# Patient Record
Sex: MALE | Race: WHITE | NOT HISPANIC OR LATINO | Employment: FULL TIME | URBAN - NONMETROPOLITAN AREA
[De-identification: names, ages, dates, MRNs, and addresses within clinical notes are randomized per-mention and may not be internally consistent; named-entity substitution may affect disease eponyms.]

---

## 2017-05-11 DIAGNOSIS — I10 BENIGN ESSENTIAL HYPERTENSION: ICD-10-CM

## 2017-05-11 NOTE — TELEPHONE ENCOUNTER
Norvasc       Last Written Prescription Date: 2/13/2017  Last Fill Quantity: 30, # refills: 0  Last Office Visit with [facility], P or [facility] prescribing provider: 6/28/2016       Potassium   Date Value Ref Range Status   06/28/2016 4.3 3.4 - 5.3 mmol/L Final     Creatinine   Date Value Ref Range Status   06/28/2016 0.81 0.66 - 1.25 mg/dL Final     BP Readings from Last 3 Encounters:   06/28/16 128/74   06/14/16 158/88   08/25/15 (!) 154/96

## 2017-05-12 RX ORDER — AMLODIPINE BESYLATE 5 MG/1
TABLET ORAL
Qty: 30 TABLET | Refills: 0 | Status: SHIPPED | OUTPATIENT
Start: 2017-05-12 | End: 2017-06-20

## 2017-06-20 DIAGNOSIS — I10 BENIGN ESSENTIAL HYPERTENSION: ICD-10-CM

## 2017-06-22 RX ORDER — AMLODIPINE BESYLATE 5 MG/1
TABLET ORAL
Qty: 30 TABLET | Refills: 0 | Status: SHIPPED | OUTPATIENT
Start: 2017-06-22 | End: 2017-07-17

## 2017-07-17 DIAGNOSIS — I10 BENIGN ESSENTIAL HYPERTENSION: ICD-10-CM

## 2017-07-19 RX ORDER — AMLODIPINE BESYLATE 5 MG/1
TABLET ORAL
Qty: 30 TABLET | Refills: 0 | Status: SHIPPED | OUTPATIENT
Start: 2017-07-19 | End: 2017-08-17

## 2017-08-17 DIAGNOSIS — I10 BENIGN ESSENTIAL HYPERTENSION: ICD-10-CM

## 2017-08-17 NOTE — TELEPHONE ENCOUNTER
Norvasc      Last Written Prescription Date: 7/19/17  Last Fill Quantity: 30, # refills: 0    Last Office Visit with G, P or [facility] prescribing provider:  6/26/16   Future Office Visit:        BP Readings from Last 3 Encounters:   06/28/16 128/74   06/14/16 158/88   08/25/15 (!) 154/96

## 2017-08-17 NOTE — LETTER
Josef Rod  [address] [facility] [address]      August 21, 2017       Dear Josef Rod,    APPOINTMENT REMINDER:       Our record indicates that it is time for you to be seen for an office visit with Dr. William.    You may call our office at [phone] to schedule an appointment for your annual exam.     Please disregard this notice if you have already made an appointment.      Sincerely,    Alban William MD  [facility]

## 2017-08-21 RX ORDER — AMLODIPINE BESYLATE 5 MG/1
TABLET ORAL
Qty: 30 TABLET | Refills: 0 | Status: SHIPPED | OUTPATIENT
Start: 2017-08-21 | End: 2017-09-19

## 2017-09-19 DIAGNOSIS — I10 BENIGN ESSENTIAL HYPERTENSION: ICD-10-CM

## 2017-09-20 RX ORDER — AMLODIPINE BESYLATE 5 MG/1
TABLET ORAL
Qty: 30 TABLET | Refills: 0 | Status: SHIPPED | OUTPATIENT
Start: 2017-09-20 | End: 2017-11-01

## 2017-09-26 ENCOUNTER — OFFICE VISIT (OUTPATIENT)
Dept: FAMILY MEDICINE | Facility: OTHER | Age: 57
End: 2017-09-26
Attending: FAMILY MEDICINE
Payer: COMMERCIAL

## 2017-09-26 VITALS
TEMPERATURE: 97.7 F | DIASTOLIC BLOOD PRESSURE: 80 MMHG | OXYGEN SATURATION: 98 % | BODY MASS INDEX: 35.5 KG/M2 | HEART RATE: 63 BPM | RESPIRATION RATE: 17 BRPM | HEIGHT: 70 IN | WEIGHT: 248 LBS | SYSTOLIC BLOOD PRESSURE: 140 MMHG

## 2017-09-26 DIAGNOSIS — E78.5 DYSLIPIDEMIA: ICD-10-CM

## 2017-09-26 DIAGNOSIS — E66.01 MORBID OBESITY (H): ICD-10-CM

## 2017-09-26 DIAGNOSIS — I10 BENIGN ESSENTIAL HYPERTENSION: Primary | ICD-10-CM

## 2017-09-26 LAB
ALBUMIN SERPL-MCNC: 4.3 G/DL (ref 3.4–5)
ALP SERPL-CCNC: 63 U/L (ref 40–150)
ALT SERPL W P-5'-P-CCNC: 38 U/L (ref 0–70)
ANION GAP SERPL CALCULATED.3IONS-SCNC: 6 MMOL/L (ref 3–14)
AST SERPL W P-5'-P-CCNC: 23 U/L (ref 0–45)
BASOPHILS # BLD AUTO: 0 10E9/L (ref 0–0.2)
BASOPHILS NFR BLD AUTO: 0.2 %
BILIRUB SERPL-MCNC: 0.9 MG/DL (ref 0.2–1.3)
BUN SERPL-MCNC: 13 MG/DL (ref 7–30)
CALCIUM SERPL-MCNC: 9.4 MG/DL (ref 8.5–10.1)
CHLORIDE SERPL-SCNC: 101 MMOL/L (ref 94–109)
CHOLEST SERPL-MCNC: 234 MG/DL
CO2 SERPL-SCNC: 30 MMOL/L (ref 20–32)
CREAT SERPL-MCNC: 1.04 MG/DL (ref 0.66–1.25)
DIFFERENTIAL METHOD BLD: NORMAL
EOSINOPHIL # BLD AUTO: 0.1 10E9/L (ref 0–0.7)
EOSINOPHIL NFR BLD AUTO: 2.5 %
ERYTHROCYTE [DISTWIDTH] IN BLOOD BY AUTOMATED COUNT: 12.9 % (ref 10–15)
GFR SERPL CREATININE-BSD FRML MDRD: 74 ML/MIN/1.7M2
GLUCOSE SERPL-MCNC: 81 MG/DL (ref 70–99)
HCT VFR BLD AUTO: 47.6 % (ref 40–53)
HDLC SERPL-MCNC: 44 MG/DL
HGB BLD-MCNC: 16.2 G/DL (ref 13.3–17.7)
IMM GRANULOCYTES # BLD: 0 10E9/L (ref 0–0.4)
IMM GRANULOCYTES NFR BLD: 0.2 %
LDLC SERPL CALC-MCNC: 146 MG/DL
LYMPHOCYTES # BLD AUTO: 1.8 10E9/L (ref 0.8–5.3)
LYMPHOCYTES NFR BLD AUTO: 35.3 %
MCH RBC QN AUTO: 29.1 PG (ref 26.5–33)
MCHC RBC AUTO-ENTMCNC: 34 G/DL (ref 31.5–36.5)
MCV RBC AUTO: 86 FL (ref 78–100)
MONOCYTES # BLD AUTO: 0.5 10E9/L (ref 0–1.3)
MONOCYTES NFR BLD AUTO: 9.6 %
NEUTROPHILS # BLD AUTO: 2.7 10E9/L (ref 1.6–8.3)
NEUTROPHILS NFR BLD AUTO: 52.2 %
NONHDLC SERPL-MCNC: 190 MG/DL
NRBC # BLD AUTO: 0 10*3/UL
NRBC BLD AUTO-RTO: 0 /100
PLATELET # BLD AUTO: 268 10E9/L (ref 150–450)
POTASSIUM SERPL-SCNC: 4.9 MMOL/L (ref 3.4–5.3)
PROT SERPL-MCNC: 8 G/DL (ref 6.8–8.8)
RBC # BLD AUTO: 5.57 10E12/L (ref 4.4–5.9)
SODIUM SERPL-SCNC: 137 MMOL/L (ref 133–144)
TRIGL SERPL-MCNC: 218 MG/DL
TSH SERPL DL<=0.005 MIU/L-ACNC: 2.85 MU/L (ref 0.4–4)
WBC # BLD AUTO: 5.1 10E9/L (ref 4–11)

## 2017-09-26 PROCEDURE — 80061 LIPID PANEL: CPT | Performed by: FAMILY MEDICINE

## 2017-09-26 PROCEDURE — 80050 GENERAL HEALTH PANEL: CPT | Performed by: FAMILY MEDICINE

## 2017-09-26 PROCEDURE — 99214 OFFICE O/P EST MOD 30 MIN: CPT | Performed by: FAMILY MEDICINE

## 2017-09-26 PROCEDURE — 36415 COLL VENOUS BLD VENIPUNCTURE: CPT | Performed by: FAMILY MEDICINE

## 2017-09-26 RX ORDER — ATORVASTATIN CALCIUM 40 MG/1
20 TABLET, FILM COATED ORAL DAILY
Qty: 45 TABLET | Refills: 0 | Status: SHIPPED | OUTPATIENT
Start: 2017-09-26 | End: 2017-11-03

## 2017-09-26 ASSESSMENT — ANXIETY QUESTIONNAIRES
2. NOT BEING ABLE TO STOP OR CONTROL WORRYING: NOT AT ALL
1. FEELING NERVOUS, ANXIOUS, OR ON EDGE: NOT AT ALL
7. FEELING AFRAID AS IF SOMETHING AWFUL MIGHT HAPPEN: NOT AT ALL
GAD7 TOTAL SCORE: 0
IF YOU CHECKED OFF ANY PROBLEMS ON THIS QUESTIONNAIRE, HOW DIFFICULT HAVE THESE PROBLEMS MADE IT FOR YOU TO DO YOUR WORK, TAKE CARE OF THINGS AT HOME, OR GET ALONG WITH OTHER PEOPLE: NOT DIFFICULT AT ALL
5. BEING SO RESTLESS THAT IT IS HARD TO SIT STILL: NOT AT ALL
6. BECOMING EASILY ANNOYED OR IRRITABLE: NOT AT ALL
3. WORRYING TOO MUCH ABOUT DIFFERENT THINGS: NOT AT ALL

## 2017-09-26 ASSESSMENT — PAIN SCALES - GENERAL: PAINLEVEL: NO PAIN (0)

## 2017-09-26 ASSESSMENT — PATIENT HEALTH QUESTIONNAIRE - PHQ9
SUM OF ALL RESPONSES TO PHQ QUESTIONS 1-9: 2
5. POOR APPETITE OR OVEREATING: NOT AT ALL

## 2017-09-26 NOTE — PROGRESS NOTES
SUBJECTIVE:  Josef Rod, 56 year old, male is seen in follow up of dyslipidemia. His labs are reviewed.  Risk is reviewed.  The 10-year ASCVD risk score (Alex LAMONT Jr, et al., 2013) is: 10.2%    Values used to calculate the score:      Age: 56 years      Sex: Male      Is Non- : No      Diabetic: No      Tobacco smoker: No      Systolic Blood Pressure: 140 mmHg      Is BP treated: Yes      HDL Cholesterol: 44 mg/dL      Total Cholesterol: 234 mg/dL     He has previously been on statin therapy    Follow up hypertension.  His blood pressure remains controlled on current regimen.    Obesity care package to be completed.    Denies chest pain, dyspnea, decreased exercise tolerance, dizzyness, nausea, vomiting, diaphoresis, palpitations, numbness, tingling, or paresthesias.      Current Outpatient Prescriptions   Medication Sig Dispense Refill     amLODIPine (NORVASC) 5 MG tablet Take 1 tablet (5 mg) by mouth daily 30 tablet 0     Cholecalciferol (VITAMIN D) 2000 UNITS tablet Take 2,000 Units by mouth daily 100 tablet 3     aspirin 325 MG tablet Take 1 tablet by mouth daily          Allergies   Allergen Reactions     Penicillins        Past Medical History:   Diagnosis Date     Dyslipidemia 2/6/2001     Obesity 8/21/2012     Past Surgical History:   Procedure Laterality Date     arthroscopy  2005    RT     ARTHROSCOPY KNEE  2005    LT     TONSILLECTOMY  19??     Family History   Problem Relation Age of Onset     Arthritis Mother      HEART DISEASE Father      Social History     Social History     Marital status:      Spouse name: N/A     Number of children: N/A     Years of education: N/A     Occupational History     Not on file.     Social History Main Topics     Smoking status: Never Smoker     Smokeless tobacco: Never Used      Comment: no passive exposure     Alcohol use Yes      Comment: beer and liquor monthly     Drug use: No     Sexual activity: Yes     Partners: Female  "    Other Topics Concern      Service No     Blood Transfusions Yes     Permits if needed     Caffeine Concern No     Occupational Exposure No     Hobby Hazards No     Sleep Concern No     Stress Concern No     Weight Concern No     Special Diet No     Back Care No     Exercise No     Seat Belt Yes     Self-Exams Yes     Parent/Sibling W/ Cabg, Mi Or Angioplasty Before 65f 55m? No     Social History Narrative         Review Of Systems  Constitutional, HEENT, cardiovascular, pulmonary, gi and gu systems are negative, except as otherwise noted.      OBJECTIVE:/80 (BP Location: Left arm, Patient Position: Chair, Cuff Size: Adult Large)  Pulse 63  Temp 97.7  F (36.5  C) (Tympanic)  Resp 17  Ht 5' 10\" (1.778 m)  Wt 248 lb (112.5 kg)  SpO2 98%  BMI 35.58 kg/m2      Exam:  Physical Exam   Constitutional: He is oriented to person, place, and time. He appears well-developed and well-nourished. No distress.   Neurological: He is alert and oriented to person, place, and time.   Psychiatric: He has a normal mood and affect.     Other exam not repeated    Labs:  Office Visit on 06/28/2016   Component Date Value Ref Range Status     WBC 06/28/2016 5.6  4.0 - 11.0 10e9/L Final     RBC Count 06/28/2016 5.74  4.4 - 5.9 10e12/L Final     Hemoglobin 06/28/2016 16.5  13.3 - 17.7 g/dL Final     Hematocrit 06/28/2016 48.4  40.0 - 53.0 % Final     MCV 06/28/2016 84  78 - 100 fl Final     MCH 06/28/2016 28.7  26.5 - 33.0 pg Final     MCHC 06/28/2016 34.1  31.5 - 36.5 g/dL Final     RDW 06/28/2016 12.6  10.0 - 15.0 % Final     Platelet Count 06/28/2016 273  150 - 450 10e9/L Final     Diff Method 06/28/2016 Automated Method   Final     % Neutrophils 06/28/2016 54.4  % Final     % Lymphocytes 06/28/2016 33.2  % Final     % Monocytes 06/28/2016 7.8  % Final     % Eosinophils 06/28/2016 3.7  % Final     % Basophils 06/28/2016 0.2  % Final     % Immature Granulocytes 06/28/2016 0.7  % Final     Nucleated RBCs 06/28/2016 0  0 " /100 Final     Absolute Neutrophil 06/28/2016 3.1  1.6 - 8.3 10e9/L Final     Absolute Lymphocytes 06/28/2016 1.9  0.8 - 5.3 10e9/L Final     Absolute Monocytes 06/28/2016 0.4  0.0 - 1.3 10e9/L Final     Absolute Eosinophils 06/28/2016 0.2  0.0 - 0.7 10e9/L Final     Absolute Basophils 06/28/2016 0.0  0.0 - 0.2 10e9/L Final     Abs Immature Granulocytes 06/28/2016 0.0  0 - 0.4 10e9/L Final     Absolute Nucleated RBC 06/28/2016 0.0   Final     Cholesterol 06/28/2016 194  <200 mg/dL Final     Triglycerides 06/28/2016 534* <150 mg/dL Final    Comment: Borderline high:  150-199 mg/dl   High:             200-499 mg/dl   Very high:       >499 mg/dl       HDL Cholesterol 06/28/2016 40  >39 mg/dL Final     LDL Cholesterol Calculated 06/28/2016 Cannot estimate LDL when triglyceride exceeds 400 mg/dL  <100 mg/dL Final     Non HDL Cholesterol 06/28/2016 154* <130 mg/dL Final    Comment: Above Desirable:  130-159 mg/dl   Borderline high:  160-189 mg/dl   High:             190-219 mg/dl   Very high:       >219 mg/dl       Sodium 06/28/2016 137  133 - 144 mmol/L Final     Potassium 06/28/2016 4.3  3.4 - 5.3 mmol/L Final     Chloride 06/28/2016 103  94 - 109 mmol/L Final     Carbon Dioxide 06/28/2016 28  20 - 32 mmol/L Final     Anion Gap 06/28/2016 6  3 - 14 mmol/L Final     Glucose 06/28/2016 85  70 - 99 mg/dL Final     Urea Nitrogen 06/28/2016 15  7 - 30 mg/dL Final     Creatinine 06/28/2016 0.81  0.66 - 1.25 mg/dL Final     GFR Estimate 06/28/2016   >60 mL/min/1.7m2 Final                    Value:>90  Non  GFR Calc       GFR Estimate If Black 06/28/2016   >60 mL/min/1.7m2 Final                    Value:>90   GFR Calc       Calcium 06/28/2016 9.1  8.5 - 10.1 mg/dL Final     Bilirubin Total 06/28/2016 0.6  0.2 - 1.3 mg/dL Final     Albumin 06/28/2016 4.3  3.4 - 5.0 g/dL Final     Protein Total 06/28/2016 8.1  6.8 - 8.8 g/dL Final     Alkaline Phosphatase 06/28/2016 78  40 - 150 U/L Final     ALT  06/28/2016 40  0 - 70 U/L Final     AST 06/28/2016 37  0 - 45 U/L Final       ASSESSMENT/PLAN:  HTN (hypertension)  Labs are drawn.  Continue same medications as outlined  - CBC with platelets differential  - Comprehensive metabolic panel  - TSH with free T4 reflex    Dyslipidemia  Restart atorvastatin as written.  - atorvastatin (LIPITOR) 40 MG tablet; Take 0.5 tablets (20 mg) by mouth daily    Morbid obesity (H)  Body mass index is 35.58 kg/(m^2).  Weight management plan: Discussed healthy diet and exercise guidelines and patient will follow up in 12 months in clinic to re-evaluate.          Alban William MD

## 2017-09-26 NOTE — MR AVS SNAPSHOT
After Visit Summary   9/26/2017    Josef Rod    MRN: [id number]           Patient Information     Date Of Birth          1960        Visit Information        Provider Department      9/26/2017 2:40 PM Alban William MD [facility]        Today's Diagnoses     HTN (hypertension)    -  1    Dyslipidemia        Morbid obesity (H)          Care Instructions    Take atorvastatin once daily          Follow-ups after your visit        Follow-up notes from your care team     Return in about 3 months (around 12/26/2017) for Lab testing, Follow Up Visit.      Who to contact     If you have questions or need follow up information about today's clinic visit or your schedule please contact [facility] directly at [phone].  Normal or non-critical lab and imaging results will be communicated to you by Janalakshmihart, letter or phone within 4 business days after the clinic has received the results. If you do not hear from us within 7 days, please contact the clinic through Janalakshmihart or phone. If you have a critical or abnormal lab result, we will notify you by phone as soon as possible.  Submit refill requests through Youku or call your pharmacy and they will forward the refill request to us. Please allow 3 business days for your refill to be completed.          Additional Information About Your Visit        MyChart Information     Youku gives you secure access to your electronic health record. If you see a primary care provider, you can also send messages to your care team and make appointments. If you have questions, please call your primary care clinic.  If you do not have a primary care provider, please call [phone] and they will assist you.        Care EveryWhere ID     This is your Care EveryWhere ID. This could be used by other organizations to access your [place] medical records  [id number]        Your Vitals Were     Pulse Temperature Respirations Height  "Pulse Oximetry BMI (Body Mass Index)    63 97.7  F (36.5  C) (Tympanic) 17 5' 10\" (1.778 m) 98% 35.58 kg/m2       Blood Pressure from Last 3 Encounters:   09/26/17 140/80   06/28/16 128/74   06/14/16 158/88    Weight from Last 3 Encounters:   09/26/17 248 lb (112.5 kg)   06/28/16 245 lb (111.1 kg)   06/14/16 240 lb (108.9 kg)              We Performed the Following     CBC with platelets differential     Comprehensive metabolic panel     Lipid Profile     TSH with free T4 reflex          Where to get your medicines      These medications were sent to [facility]  [place] - [address] [place] [address]     Phone:  [phone]     atorvastatin 40 MG tablet          Primary Care Provider Office Phone # Fax #    Alban William [phone]       [facility] [address] [facility]  [place] [address]        Equal Access to Services     [facility] Hospital[facility]NA[facility] : Hadii gibran ku hadasho Soomaali, waaxda luqadaha, qaybta kaalmada adeegyada, dani maharajin hayalician manolo shoemaker . So [facility] [phone].    ATENCIÓN: Si habla español, tiene a genao disposición servicios gratuitos de asistencia lingüística. [facility] [phone].    We comply with applicable federal civil rights laws and [place] laws. We do not discriminate on the basis of race, color, national origin, age, disability, sex, sexual orientation, or gender identity.            Thank you!     Thank you for choosing [facility]  for your care. Our goal is always to provide you with excellent care. Hearing back from our patients is one way we can continue to improve our services. Please take a few minutes to complete the written survey that you may receive in the mail after your visit with us. Thank you!             Your Updated Medication List - Protect others around you: Learn how to safely use, store and throw away your medicines at www.disposemymeds.org.          This list is accurate as of: 9/26/17 11:59 PM.  " Always use your most recent med list.                   Brand Name Dispense Instructions for use Diagnosis    amLODIPine 5 MG tablet    NORVASC    30 tablet    Take 1 tablet (5 mg) by mouth daily    Benign essential hypertension       aspirin 325 MG tablet      Take 1 tablet by mouth daily        atorvastatin 40 MG tablet    LIPITOR    45 tablet    Take 0.5 tablets (20 mg) by mouth daily    Dyslipidemia       vitamin D 2000 UNITS tablet     100 tablet    Take 2,000 Units by mouth daily    Vitamin D deficiency

## 2017-09-26 NOTE — NURSING NOTE
"Chief Complaint   Patient presents with     Recheck Medication       Initial /80 (BP Location: Left arm, Patient Position: Chair, Cuff Size: Adult Large)  Pulse 63  Temp 97.7  F (36.5  C) (Tympanic)  Resp 17  Ht 5' 10\" (1.778 m)  Wt 248 lb (112.5 kg)  SpO2 98%  BMI 35.58 kg/m2 Estimated body mass index is 35.58 kg/(m^2) as calculated from the following:    Height as of this encounter: 5' 10\" (1.778 m).    Weight as of this encounter: 248 lb (112.5 kg).  Medication Reconciliation: complete   Emily Pinedo    "

## 2017-09-27 ASSESSMENT — ANXIETY QUESTIONNAIRES: GAD7 TOTAL SCORE: 0

## 2017-10-01 PROBLEM — E66.01 MORBID OBESITY (H): Status: ACTIVE | Noted: 2017-10-01

## 2017-11-01 DIAGNOSIS — I10 BENIGN ESSENTIAL HYPERTENSION: ICD-10-CM

## 2017-11-01 NOTE — TELEPHONE ENCOUNTER
Norvasc       Last Written Prescription Date: 9/20/2017  Last Fill Quantity: 30,  # refills: 0   Last Office Visit with G, [facility] or [facility] prescribing provider: 9/26/2017

## 2017-11-03 ENCOUNTER — TRANSFERRED RECORDS (OUTPATIENT)
Dept: HEALTH INFORMATION MANAGEMENT | Facility: HOSPITAL | Age: 57
End: 2017-11-03

## 2017-11-03 DIAGNOSIS — E78.5 DYSLIPIDEMIA: ICD-10-CM

## 2017-11-03 RX ORDER — AMLODIPINE BESYLATE 5 MG/1
TABLET ORAL
Qty: 30 TABLET | Refills: 8 | Status: SHIPPED | OUTPATIENT
Start: 2017-11-03 | End: 2018-07-05

## 2017-11-06 NOTE — TELEPHONE ENCOUNTER
Lipitor       Last Written Prescription Date: 9/2/2017  Last Fill Quantity: 45,  # refills: 0   Last Office Visit with G, [facility] or [facility] prescribing provider: 9/26/2017

## 2017-11-08 RX ORDER — ATORVASTATIN CALCIUM 40 MG/1
TABLET, FILM COATED ORAL
Qty: 45 TABLET | Refills: 0 | Status: SHIPPED | OUTPATIENT
Start: 2017-11-08 | End: 2018-10-09

## 2017-11-08 NOTE — TELEPHONE ENCOUNTER
Lipitor  Last office visit: 9/26/17  Last fill: 9/26/17 #45, 0 R.  Just restarted Lipitor.  Please advise.  Thank you.

## 2017-11-17 DIAGNOSIS — M17.0 PRIMARY OSTEOARTHRITIS OF BOTH KNEES: Primary | ICD-10-CM

## 2017-11-20 ENCOUNTER — OFFICE VISIT (OUTPATIENT)
Dept: FAMILY MEDICINE | Facility: OTHER | Age: 57
End: 2017-11-20
Attending: FAMILY MEDICINE
Payer: COMMERCIAL

## 2017-11-20 VITALS
TEMPERATURE: 98 F | BODY MASS INDEX: 37.22 KG/M2 | SYSTOLIC BLOOD PRESSURE: 148 MMHG | HEIGHT: 70 IN | OXYGEN SATURATION: 97 % | WEIGHT: 260 LBS | DIASTOLIC BLOOD PRESSURE: 82 MMHG | HEART RATE: 71 BPM

## 2017-11-20 DIAGNOSIS — M17.0 PRIMARY OSTEOARTHRITIS OF BOTH KNEES: Primary | ICD-10-CM

## 2017-11-20 DIAGNOSIS — Z01.818 PREOP GENERAL PHYSICAL EXAM: Primary | ICD-10-CM

## 2017-11-20 LAB
ANION GAP SERPL CALCULATED.3IONS-SCNC: 8 MMOL/L (ref 3–14)
APTT PPP: 25 SEC (ref 24–37)
BASOPHILS # BLD AUTO: 0 10E9/L (ref 0–0.2)
BASOPHILS NFR BLD AUTO: 0.3 %
BUN SERPL-MCNC: 17 MG/DL (ref 7–30)
CALCIUM SERPL-MCNC: 9.6 MG/DL (ref 8.5–10.1)
CHLORIDE SERPL-SCNC: 101 MMOL/L (ref 94–109)
CO2 SERPL-SCNC: 29 MMOL/L (ref 20–32)
CREAT SERPL-MCNC: 0.98 MG/DL (ref 0.66–1.25)
DIFFERENTIAL METHOD BLD: NORMAL
EOSINOPHIL # BLD AUTO: 0.7 10E9/L (ref 0–0.7)
EOSINOPHIL NFR BLD AUTO: 9.7 %
ERYTHROCYTE [DISTWIDTH] IN BLOOD BY AUTOMATED COUNT: 12.7 % (ref 10–15)
ERYTHROCYTE [SEDIMENTATION RATE] IN BLOOD BY WESTERGREN METHOD: 5 MM/H (ref 0–20)
GFR SERPL CREATININE-BSD FRML MDRD: 79 ML/MIN/1.7M2
GLUCOSE SERPL-MCNC: 79 MG/DL (ref 70–99)
HCT VFR BLD AUTO: 48.9 % (ref 40–53)
HGB BLD-MCNC: 16.6 G/DL (ref 13.3–17.7)
IMM GRANULOCYTES # BLD: 0 10E9/L (ref 0–0.4)
IMM GRANULOCYTES NFR BLD: 0.4 %
INR PPP: 1.02 (ref 0.8–1.2)
LYMPHOCYTES # BLD AUTO: 2.2 10E9/L (ref 0.8–5.3)
LYMPHOCYTES NFR BLD AUTO: 30 %
MCH RBC QN AUTO: 28.9 PG (ref 26.5–33)
MCHC RBC AUTO-ENTMCNC: 33.9 G/DL (ref 31.5–36.5)
MCV RBC AUTO: 85 FL (ref 78–100)
MONOCYTES # BLD AUTO: 0.6 10E9/L (ref 0–1.3)
MONOCYTES NFR BLD AUTO: 8.6 %
NEUTROPHILS # BLD AUTO: 3.7 10E9/L (ref 1.6–8.3)
NEUTROPHILS NFR BLD AUTO: 51 %
NRBC # BLD AUTO: 0 10*3/UL
NRBC BLD AUTO-RTO: 0 /100
PLATELET # BLD AUTO: 242 10E9/L (ref 150–450)
POTASSIUM SERPL-SCNC: 4.3 MMOL/L (ref 3.4–5.3)
RBC # BLD AUTO: 5.75 10E12/L (ref 4.4–5.9)
SODIUM SERPL-SCNC: 138 MMOL/L (ref 133–144)
WBC # BLD AUTO: 7.2 10E9/L (ref 4–11)

## 2017-11-20 PROCEDURE — 85730 THROMBOPLASTIN TIME PARTIAL: CPT | Performed by: FAMILY MEDICINE

## 2017-11-20 PROCEDURE — 99214 OFFICE O/P EST MOD 30 MIN: CPT | Mod: 25 | Performed by: FAMILY MEDICINE

## 2017-11-20 PROCEDURE — 93000 ELECTROCARDIOGRAM COMPLETE: CPT | Performed by: INTERNAL MEDICINE

## 2017-11-20 PROCEDURE — 85610 PROTHROMBIN TIME: CPT | Performed by: FAMILY MEDICINE

## 2017-11-20 PROCEDURE — 80048 BASIC METABOLIC PNL TOTAL CA: CPT | Performed by: FAMILY MEDICINE

## 2017-11-20 PROCEDURE — 85652 RBC SED RATE AUTOMATED: CPT | Performed by: FAMILY MEDICINE

## 2017-11-20 PROCEDURE — 36415 COLL VENOUS BLD VENIPUNCTURE: CPT | Performed by: FAMILY MEDICINE

## 2017-11-20 PROCEDURE — 85025 COMPLETE CBC W/AUTO DIFF WBC: CPT | Performed by: FAMILY MEDICINE

## 2017-11-20 ASSESSMENT — PAIN SCALES - GENERAL: PAINLEVEL: SEVERE PAIN (7)

## 2017-11-20 ASSESSMENT — ANXIETY QUESTIONNAIRES
5. BEING SO RESTLESS THAT IT IS HARD TO SIT STILL: NOT AT ALL
1. FEELING NERVOUS, ANXIOUS, OR ON EDGE: NOT AT ALL
6. BECOMING EASILY ANNOYED OR IRRITABLE: NOT AT ALL
2. NOT BEING ABLE TO STOP OR CONTROL WORRYING: NOT AT ALL
IF YOU CHECKED OFF ANY PROBLEMS ON THIS QUESTIONNAIRE, HOW DIFFICULT HAVE THESE PROBLEMS MADE IT FOR YOU TO DO YOUR WORK, TAKE CARE OF THINGS AT HOME, OR GET ALONG WITH OTHER PEOPLE: NOT DIFFICULT AT ALL
3. WORRYING TOO MUCH ABOUT DIFFERENT THINGS: NOT AT ALL
GAD7 TOTAL SCORE: 0
7. FEELING AFRAID AS IF SOMETHING AWFUL MIGHT HAPPEN: NOT AT ALL

## 2017-11-20 ASSESSMENT — PATIENT HEALTH QUESTIONNAIRE - PHQ9
SUM OF ALL RESPONSES TO PHQ QUESTIONS 1-9: 0
5. POOR APPETITE OR OVEREATING: NOT AT ALL

## 2017-11-20 NOTE — MR AVS SNAPSHOT
After Visit Summary   11/20/2017    Josef Rod    MRN: [id number]           Patient Information     Date Of Birth          1960        Visit Information        Provider Department      11/20/2017 11:20 AM [facility]        Today's Diagnoses     Preop general physical exam    -  1      Care Instructions      Before Your Surgery      Call your surgeon if there is any change in your health. This includes signs of a cold or flu (such as a sore throat, runny nose, cough, rash or fever).    Do not smoke, drink alcohol or take over the counter medicine (unless your surgeon or primary care doctor tells you to) for the 24 hours before and after surgery.    If you take prescribed drugs: Follow your doctor s orders about which medicines to take and which to stop until after surgery.    Eating and drinking prior to surgery: follow the instructions from your surgeon    Take a shower or bath the night before surgery. Use the soap your surgeon gave you to gently clean your skin. If you do not have soap from your surgeon, use your regular soap. Do not shave or scrub the surgery site.  Wear clean pajamas and have clean sheets on your bed.           Follow-ups after your visit        Who to contact     If you have questions or need follow up information about today's clinic visit or your schedule please contact [facility] directly at [phone].  Normal or non-critical lab and imaging results will be communicated to you by MyChart, letter or phone within 4 business days after the clinic has received the results. If you do not hear from us within 7 days, please contact the clinic through MyChart or phone. If you have a critical or abnormal lab result, we will notify you by phone as soon as possible.  Submit refill requests through FIZZA or call your pharmacy and they will forward the refill request to us. Please allow 3 business days for your refill to be  "completed.          Additional Information About Your Visit        MyChart Information     ShareSquare gives you secure access to your electronic health record. If you see a primary care provider, you can also send messages to your care team and make appointments. If you have questions, please call your primary care clinic.  If you do not have a primary care provider, please call [phone] and they will assist you.        Care EveryWhere ID     This is your Care EveryWhere ID. This could be used by other organizations to access your [place] medical records  [id number]        Your Vitals Were     Pulse Temperature Height Pulse Oximetry BMI (Body Mass Index)       71 98  F (36.7  C) (Tympanic) 5' 10\" (1.778 m) 97% 37.31 kg/m2        Blood Pressure from Last 3 Encounters:   11/20/17 148/82   09/26/17 140/80   06/28/16 128/74    Weight from Last 3 Encounters:   11/20/17 260 lb (117.9 kg)   09/26/17 248 lb (112.5 kg)   06/28/16 245 lb (111.1 kg)              We Performed the Following     Basic metabolic panel     CBC with platelets differential     EKG 12-lead complete w/read - (Clinic Performed)     ESR: Erythrocyte sedimentation rate     INR     Partial thromboplastin time        Primary Care Provider Office Phone # Fax #    Alban William [phone]       [facility] [address]  [place] [address]        Equal Access to Services     TRISH FERNANDEZ AH: Hadii aad ku hadasho Soomaali, waaxda luqadaha, qaybta kaalmada adeegyada, dani shoemaker . So [facility] [phone].    ATENCIÓN: Si habla español, tiene a genao disposición servicios gratuitos de asistencia lingüística. Llame al [phone].    We comply with applicable federal civil rights laws and [place] laws. We do not discriminate on the basis of race, color, national origin, age, disability, sex, sexual orientation, or gender identity.            Thank you!     Thank you for choosing [facility]  for " your care. Our goal is always to provide you with excellent care. Hearing back from our patients is one way we can continue to improve our services. Please take a few minutes to complete the written survey that you may receive in the mail after your visit with us. Thank you!             Your Updated Medication List - Protect others around you: Learn how to safely use, store and throw away your medicines at www.disposemymeds.org.          This list is accurate as of: 11/20/17 11:59 PM.  Always use your most recent med list.                   Brand Name Dispense Instructions for use Diagnosis    amLODIPine 5 MG tablet    NORVASC    30 tablet    Take 1 tablet (5 mg) by mouth daily    Benign essential hypertension       aspirin 325 MG tablet      Take 1 tablet by mouth daily        atorvastatin 40 MG tablet    LIPITOR    45 tablet    Take 0.5 tablets (20 mg) by mouth daily    Dyslipidemia       vitamin D 2000 UNITS tablet     100 tablet    Take 2,000 Units by mouth daily    Vitamin D deficiency

## 2017-11-20 NOTE — NURSING NOTE
"Chief Complaint   Patient presents with     Pre-Op Exam       Initial /82  Pulse 71  Temp 98  F (36.7  C) (Tympanic)  Ht 5' 10\" (1.778 m)  Wt 260 lb (117.9 kg)  SpO2 97%  BMI 37.31 kg/m2 Estimated body mass index is 37.31 kg/(m^2) as calculated from the following:    Height as of this encounter: 5' 10\" (1.778 m).    Weight as of this encounter: 260 lb (117.9 kg).  Medication Reconciliation: complete   HARISH MORTON LPN  "

## 2017-11-20 NOTE — PROGRESS NOTES
[facility] [place]  [address]  [place] [address]  [phone]  Dept: [phone]    PRE-OP EVALUATION:  Today's date: 2017    Josef Rod (: 1960) presents for pre-operative evaluation assessment as requested by Dr. Bean.  He requires evaluation and anesthesia risk assessment prior to undergoing surgery/procedure for treatment of knee pain .  Proposed procedure: Right total knee replacement    Date of Surgery/ Procedure: 10/05/17  Time of Surgery/ Procedure: [facility]/Surgical Facility: [facility]  Primary Physician: Alban William  Type of Anesthesia Anticipated: to be determined    Patient has a Health Care Directive or Living Will:  NO    1. NO - Do you have a history of heart attack, stroke, stent, bypass or surgery on an artery in the head, neck, heart or legs?  2. NO - Do you ever have any pain or discomfort in your chest?  3. NO - Do you have a history of  Heart Failure?  4. NO - Are you troubled by shortness of breath when: walking on the level, up a slight hill or at night?  5. NO - Do you currently have a cold, bronchitis or other respiratory infection?  6. NO - Do you have a cough, shortness of breath or wheezing?  7. NO - Do you sometimes get pains in the calves of your legs when you walk?  8. NO - Do you or anyone in your family have previous history of blood clots?  9. NO - Do you or does anyone in your family have a serious bleeding problem such as prolonged bleeding following surgeries or cuts?  10. NO - Have you ever had problems with anemia or been told to take iron pills?  11. NO - Have you had any abnormal blood loss such as black, tarry or bloody stools, or abnormal vaginal bleeding?  12. NO - Have you ever had a blood transfusion?  13. NO - Have you or any of your relatives ever had problems with anesthesia?  14. NO - Do you have sleep apnea, excessive snoring or daytime drowsiness?  15. NO - Do you have any prosthetic heart valves?  16. NO - Do you  have prosthetic joints?  17. NO - Is there any chance that you may be pregnant?        HPI:                                                      Brief HPI related to upcoming procedure: Brenden has a history of bilateral knee osteoarthritis  and has failed conservative management..  He was seen by Orthopedics  where it was felt the patient would benefit from surgical management.  I was asked to see him for preoperative medical clearance.        See problem list for active medical problems.  Problems all longstanding and stable, except as noted/documented.  See ROS for pertinent symptoms related to these conditions.                                                                                                  .    MEDICAL HISTORY:                                                    Patient Active Problem List    Diagnosis Date Noted     Morbid obesity (H) 10/01/2017     Priority: Medium     HTN (hypertension) 07/05/2016     Priority: Medium     ACP (advance care planning) 06/28/2016     Priority: Medium     Advance Care Planning 6/28/2016: ACP Review of Chart / Resources Provided:  Reviewed chart for advance care plan.  Josef Rod has no plan or code status on file. Discussed available resources and provided with information. Confirmed code status reflects current choices pending further ACP discussions.  Confirmed/documented legally designated decision makers.  Added by Reema Kumar             Gout 12/23/2014     Priority: Medium     Dyslipidemia 10/29/2013     Priority: Medium      Past Medical History:   Diagnosis Date     Dyslipidemia 2/6/2001     Obesity 8/21/2012     Past Surgical History:   Procedure Laterality Date     arthroscopy  2005    RT     ARTHROSCOPY KNEE  2005    LT     TONSILLECTOMY  19??     Current Outpatient Prescriptions   Medication Sig Dispense Refill     atorvastatin (LIPITOR) 40 MG tablet Take 0.5 tablets (20 mg) by mouth daily 45 tablet 0     amLODIPine (NORVASC) 5 MG tablet  "Take 1 tablet (5 mg) by mouth daily 30 tablet 8     Cholecalciferol (VITAMIN D) 2000 UNITS tablet Take 2,000 Units by mouth daily 100 tablet 3     aspirin 325 MG tablet Take 1 tablet by mouth daily       OTC products: None, except as noted above    Allergies   Allergen Reactions     Penicillins       Latex Allergy: NO    Social History   Substance Use Topics     Smoking status: Never Smoker     Smokeless tobacco: Never Used      Comment: no passive exposure     Alcohol use Yes      Comment: beer and liquor monthly     History   Drug Use No       REVIEW OF SYSTEMS:                                                    Constitutional, neuro, ENT, endocrine, pulmonary, cardiac, gastrointestinal, genitourinary, musculoskeletal, integument and psychiatric systems are negative, except as otherwise noted.      EXAM:                                                    /82  Pulse 71  Temp 98  F (36.7  C) (Tympanic)  Ht 5' 10\" (1.778 m)  Wt 260 lb (117.9 kg)  SpO2 97%  BMI 37.31 kg/m2    GENERAL APPEARANCE: healthy, alert and no distress     EYES: EOMI,  PERRL     HENT: ear canals and TM's normal and nose and mouth without ulcers or lesions     NECK: no adenopathy, no asymmetry, masses, or scars and thyroid normal to palpation     RESP: lungs clear to auscultation - no rales, rhonchi or wheezes     CV: regular rates and rhythm, normal S1 S2, no S3 or S4 and no murmur, click or rub     ABDOMEN:  soft, nontender, no HSM or masses and bowel sounds normal     MS: extremities normal- no gross deformities noted, no evidence of inflammation in joints, FROM in all extremities.     SKIN: no suspicious lesions or rashes     NEURO: Normal strength and tone, sensory exam grossly normal, mentation intact and speech normal     PSYCH: mentation appears normal. and affect normal/bright     LYMPHATICS: No axillary, cervical, or supraclavicular nodes    DIAGNOSTICS:                                                      EKG: appears " normal, NSR, normal axis, normal intervals, no acute ST/T changes c/w ischemia, no LVH by voltage criteria, unchanged from previous tracings  Labs Resulted Today:   Results for orders placed or performed in visit on 11/20/17   CBC with platelets differential   Result Value Ref Range    WBC 7.2 4.0 - 11.0 10e9/L    RBC Count 5.75 4.4 - 5.9 10e12/L    Hemoglobin 16.6 13.3 - 17.7 g/dL    Hematocrit 48.9 40.0 - 53.0 %    MCV 85 78 - 100 fl    MCH 28.9 26.5 - 33.0 pg    MCHC 33.9 31.5 - 36.5 g/dL    RDW 12.7 10.0 - 15.0 %    Platelet Count 242 150 - 450 10e9/L    Diff Method Automated Method     % Neutrophils 51.0 %    % Lymphocytes 30.0 %    % Monocytes 8.6 %    % Eosinophils 9.7 %    % Basophils 0.3 %    % Immature Granulocytes 0.4 %    Nucleated RBCs 0 0 /100    Absolute Neutrophil 3.7 1.6 - 8.3 10e9/L    Absolute Lymphocytes 2.2 0.8 - 5.3 10e9/L    Absolute Monocytes 0.6 0.0 - 1.3 10e9/L    Absolute Eosinophils 0.7 0.0 - 0.7 10e9/L    Absolute Basophils 0.0 0.0 - 0.2 10e9/L    Abs Immature Granulocytes 0.0 0 - 0.4 10e9/L    Absolute Nucleated RBC 0.0    Basic metabolic panel   Result Value Ref Range    Sodium 138 133 - 144 mmol/L    Potassium 4.3 3.4 - 5.3 mmol/L    Chloride 101 94 - 109 mmol/L    Carbon Dioxide 29 20 - 32 mmol/L    Anion Gap 8 3 - 14 mmol/L    Glucose 79 70 - 99 mg/dL    Urea Nitrogen 17 7 - 30 mg/dL    Creatinine 0.98 0.66 - 1.25 mg/dL    GFR Estimate 79 >60 mL/min/1.7m2    GFR Estimate If Black >90 >60 mL/min/1.7m2    Calcium 9.6 8.5 - 10.1 mg/dL   INR   Result Value Ref Range    INR 1.02 0.80 - 1.20   Partial thromboplastin time   Result Value Ref Range    PTT 25 24.00 - 37.00 sec   ESR: Erythrocyte sedimentation rate   Result Value Ref Range    Sed Rate 5 0 - 20 mm/h       Recent Labs   Lab Test  09/26/17   1127  06/28/16   1135   HGB  16.2  16.5   PLT  268  273   NA  137  137   POTASSIUM  4.9  4.3   CR  1.04  0.81        IMPRESSION:                                                    Reason for  surgery/procedure: Osteoarthritis, right knee    The proposed surgical procedure is considered INTERMEDIATE risk.    REVISED CARDIAC RISK INDEX  The patient has the following serious cardiovascular risks for perioperative complications such as (MI, PE, VFib and 3  AV Block):  No serious cardiac risks  INTERPRETATION: 0 risks: Class I (very low risk - 0.4% complication rate)    The patient has the following additional risks for perioperative complications:  No identified additional risks    No diagnosis found.    RECOMMENDATIONS:                                                      APPROVAL GIVEN to proceed with proposed procedure, without further diagnostic evaluation       Signed Electronically by: Alban William MD    Copy of this evaluation report is provided to requesting physician.    [place] Preop Guidelines

## 2017-11-21 ASSESSMENT — ANXIETY QUESTIONNAIRES: GAD7 TOTAL SCORE: 0

## 2018-01-19 ENCOUNTER — OFFICE VISIT (OUTPATIENT)
Dept: FAMILY MEDICINE | Facility: OTHER | Age: 58
End: 2018-01-19
Attending: FAMILY MEDICINE
Payer: COMMERCIAL

## 2018-01-19 VITALS
HEART RATE: 61 BPM | DIASTOLIC BLOOD PRESSURE: 80 MMHG | OXYGEN SATURATION: 98 % | SYSTOLIC BLOOD PRESSURE: 144 MMHG | HEIGHT: 70 IN | WEIGHT: 257 LBS | BODY MASS INDEX: 36.79 KG/M2 | TEMPERATURE: 97.8 F

## 2018-01-19 DIAGNOSIS — E78.2 MIXED HYPERLIPIDEMIA: ICD-10-CM

## 2018-01-19 DIAGNOSIS — Z01.818 PREOP GENERAL PHYSICAL EXAM: Primary | ICD-10-CM

## 2018-01-19 LAB
ALBUMIN UR-MCNC: 10 MG/DL
ANION GAP SERPL CALCULATED.3IONS-SCNC: 6 MMOL/L (ref 3–14)
APPEARANCE UR: CLEAR
BACTERIA #/AREA URNS HPF: ABNORMAL /HPF
BASOPHILS # BLD AUTO: 0 10E9/L (ref 0–0.2)
BASOPHILS NFR BLD AUTO: 0.2 %
BILIRUB UR QL STRIP: NEGATIVE
BUN SERPL-MCNC: 19 MG/DL (ref 7–30)
CALCIUM SERPL-MCNC: 9.2 MG/DL (ref 8.5–10.1)
CHLORIDE SERPL-SCNC: 103 MMOL/L (ref 94–109)
CHOLEST SERPL-MCNC: 243 MG/DL
CO2 SERPL-SCNC: 28 MMOL/L (ref 20–32)
COLOR UR AUTO: YELLOW
CREAT SERPL-MCNC: 1.06 MG/DL (ref 0.66–1.25)
DIFFERENTIAL METHOD BLD: NORMAL
EOSINOPHIL # BLD AUTO: 0.2 10E9/L (ref 0–0.7)
EOSINOPHIL NFR BLD AUTO: 2.7 %
ERYTHROCYTE [DISTWIDTH] IN BLOOD BY AUTOMATED COUNT: 12.6 % (ref 10–15)
GFR SERPL CREATININE-BSD FRML MDRD: 72 ML/MIN/1.7M2
GLUCOSE SERPL-MCNC: 86 MG/DL (ref 70–99)
GLUCOSE UR STRIP-MCNC: NEGATIVE MG/DL
HCT VFR BLD AUTO: 49 % (ref 40–53)
HDLC SERPL-MCNC: 44 MG/DL
HGB BLD-MCNC: 16.5 G/DL (ref 13.3–17.7)
HGB UR QL STRIP: NEGATIVE
HYALINE CASTS #/AREA URNS LPF: 6 /LPF
IMM GRANULOCYTES # BLD: 0 10E9/L (ref 0–0.4)
IMM GRANULOCYTES NFR BLD: 0.2 %
KETONES UR STRIP-MCNC: NEGATIVE MG/DL
LDLC SERPL CALC-MCNC: 165 MG/DL
LEUKOCYTE ESTERASE UR QL STRIP: NEGATIVE
LYMPHOCYTES # BLD AUTO: 1.7 10E9/L (ref 0.8–5.3)
LYMPHOCYTES NFR BLD AUTO: 30.3 %
MCH RBC QN AUTO: 28.7 PG (ref 26.5–33)
MCHC RBC AUTO-ENTMCNC: 33.7 G/DL (ref 31.5–36.5)
MCV RBC AUTO: 85 FL (ref 78–100)
MONOCYTES # BLD AUTO: 0.5 10E9/L (ref 0–1.3)
MONOCYTES NFR BLD AUTO: 8.7 %
MUCOUS THREADS #/AREA URNS LPF: PRESENT /LPF
NEUTROPHILS # BLD AUTO: 3.2 10E9/L (ref 1.6–8.3)
NEUTROPHILS NFR BLD AUTO: 57.9 %
NITRATE UR QL: NEGATIVE
NONHDLC SERPL-MCNC: 199 MG/DL
NRBC # BLD AUTO: 0 10*3/UL
NRBC BLD AUTO-RTO: 0 /100
PH UR STRIP: 6.5 PH (ref 4.7–8)
PLATELET # BLD AUTO: 252 10E9/L (ref 150–450)
POTASSIUM SERPL-SCNC: 4.1 MMOL/L (ref 3.4–5.3)
RBC # BLD AUTO: 5.74 10E12/L (ref 4.4–5.9)
RBC #/AREA URNS AUTO: 1 /HPF (ref 0–2)
SODIUM SERPL-SCNC: 137 MMOL/L (ref 133–144)
SOURCE: ABNORMAL
SP GR UR STRIP: 1.02 (ref 1–1.03)
TRIGL SERPL-MCNC: 171 MG/DL
UROBILINOGEN UR STRIP-MCNC: NORMAL MG/DL (ref 0–2)
WBC # BLD AUTO: 5.5 10E9/L (ref 4–11)
WBC #/AREA URNS AUTO: 1 /HPF (ref 0–2)

## 2018-01-19 PROCEDURE — 80048 BASIC METABOLIC PNL TOTAL CA: CPT | Performed by: FAMILY MEDICINE

## 2018-01-19 PROCEDURE — 36415 COLL VENOUS BLD VENIPUNCTURE: CPT | Performed by: FAMILY MEDICINE

## 2018-01-19 PROCEDURE — 85025 COMPLETE CBC W/AUTO DIFF WBC: CPT | Performed by: FAMILY MEDICINE

## 2018-01-19 PROCEDURE — 81001 URINALYSIS AUTO W/SCOPE: CPT | Performed by: FAMILY MEDICINE

## 2018-01-19 PROCEDURE — 99214 OFFICE O/P EST MOD 30 MIN: CPT | Performed by: FAMILY MEDICINE

## 2018-01-19 PROCEDURE — 80061 LIPID PANEL: CPT | Performed by: FAMILY MEDICINE

## 2018-01-19 ASSESSMENT — ANXIETY QUESTIONNAIRES
1. FEELING NERVOUS, ANXIOUS, OR ON EDGE: NOT AT ALL
5. BEING SO RESTLESS THAT IT IS HARD TO SIT STILL: NOT AT ALL
IF YOU CHECKED OFF ANY PROBLEMS ON THIS QUESTIONNAIRE, HOW DIFFICULT HAVE THESE PROBLEMS MADE IT FOR YOU TO DO YOUR WORK, TAKE CARE OF THINGS AT HOME, OR GET ALONG WITH OTHER PEOPLE: NOT DIFFICULT AT ALL
GAD7 TOTAL SCORE: 0
7. FEELING AFRAID AS IF SOMETHING AWFUL MIGHT HAPPEN: NOT AT ALL
6. BECOMING EASILY ANNOYED OR IRRITABLE: NOT AT ALL
3. WORRYING TOO MUCH ABOUT DIFFERENT THINGS: NOT AT ALL
2. NOT BEING ABLE TO STOP OR CONTROL WORRYING: NOT AT ALL

## 2018-01-19 ASSESSMENT — PATIENT HEALTH QUESTIONNAIRE - PHQ9
5. POOR APPETITE OR OVEREATING: NOT AT ALL
SUM OF ALL RESPONSES TO PHQ QUESTIONS 1-9: 0

## 2018-01-19 ASSESSMENT — PAIN SCALES - GENERAL: PAINLEVEL: SEVERE PAIN (6)

## 2018-01-19 NOTE — MR AVS SNAPSHOT
After Visit Summary   1/19/2018    Josef Rod    MRN: [id number]           Patient Information     Date Of Birth          1960        Visit Information        Provider Department      1/19/2018 10:20 AM Alban William MD [facility]        Today's Diagnoses     Preop general physical exam    -  1    Dyslipidemia          Care Instructions      Before Your Surgery      Call your surgeon if there is any change in your health. This includes signs of a cold or flu (such as a sore throat, runny nose, cough, rash or fever).    Do not smoke, drink alcohol or take over the counter medicine (unless your surgeon or primary care doctor tells you to) for the 24 hours before and after surgery.    If you take prescribed drugs: Follow your doctor s orders about which medicines to take and which to stop until after surgery.    Eating and drinking prior to surgery: follow the instructions from your surgeon    Take a shower or bath the night before surgery. Use the soap your surgeon gave you to gently clean your skin. If you do not have soap from your surgeon, use your regular soap. Do not shave or scrub the surgery site.  Wear clean pajamas and have clean sheets on your bed.           Follow-ups after your visit        Who to contact     If you have questions or need follow up information about today's clinic visit or your schedule please contact [facility] directly at [phone].  Normal or non-critical lab and imaging results will be communicated to you by MyChart, letter or phone within 4 business days after the clinic has received the results. If you do not hear from us within 7 days, please contact the clinic through MyChart or phone. If you have a critical or abnormal lab result, we will notify you by phone as soon as possible.  Submit refill requests through MyPrintCloud or call your pharmacy and they will forward the refill request to us. Please allow 3 business days for  "your refill to be completed.          Additional Information About Your Visit        MyChart Information     Cl[employer]ed gives you secure access to your electronic health record. If you see a primary care provider, you can also send messages to your care team and make appointments. If you have questions, please call your primary care clinic.  If you do not have a primary care provider, please call [phone] and they will assist you.        Care EveryWhere ID     This is your Care EveryWhere ID. This could be used by other organizations to access your [facility] medical records  [id number]        Your Vitals Were     Pulse Temperature Height Pulse Oximetry BMI (Body Mass Index)       61 97.8  F (36.6  C) (Tympanic) 5' 10\" (1.778 m) 98% 36.88 kg/m2        Blood Pressure from Last 3 Encounters:   01/19/18 144/80   11/20/17 148/82   09/26/17 140/80    Weight from Last 3 Encounters:   01/19/18 257 lb (116.6 kg)   11/20/17 260 lb (117.9 kg)   09/26/17 248 lb (112.5 kg)              We Performed the Following     Basic metabolic panel     CBC with platelets differential     Lipid Profile     UA reflex to Microscopic and Culture        Primary Care Provider Office Phone # Fax #    Alban William [phone]       [facility] [address] [address]        Equal Access to Services     TRISH FERNANDEZ : Hadii aad ku hadasho Soomaali, waaxda luqadaha, qaybta kaalmada adeegyada, dani shoemaker . So [facility] [phone].    ATENCIÓN: Si habla español, tiene a genao disposición servicios gratuitos de asistencia lingüística. Llame al [phone].    We comply with applicable federal civil rights laws and [place] laws. We do not discriminate on the basis of race, color, national origin, age, disability, sex, sexual orientation, or gender identity.            Thank you!     Thank you for choosing [facility]  for your care. Our goal is always to provide you with " excellent care. Hearing back from our patients is one way we can continue to improve our services. Please take a few minutes to complete the written survey that you may receive in the mail after your visit with us. Thank you!             Your Updated Medication List - Protect others around you: Learn how to safely use, store and throw away your medicines at www.disposemymeds.org.          This list is accurate as of: 1/19/18 11:59 PM.  Always use your most recent med list.                   Brand Name Dispense Instructions for use Diagnosis    amLODIPine 5 MG tablet    NORVASC    30 tablet    Take 1 tablet (5 mg) by mouth daily    Benign essential hypertension       aspirin 325 MG tablet      Take 1 tablet by mouth daily        atorvastatin 40 MG tablet    LIPITOR    45 tablet    Take 0.5 tablets (20 mg) by mouth daily    Dyslipidemia       vitamin D 2000 UNITS tablet     100 tablet    Take 2,000 Units by mouth daily    Vitamin D deficiency

## 2018-01-19 NOTE — PROGRESS NOTES
[facility] [place]  [address]  [place] [address]  [phone]  Dept: [phone]    PRE-OP EVALUATION:  Today's date: 2018    Josef Rod (: 1960) presents for pre-operative evaluation assessment as requested by Dr. Bean.  He requires evaluation and anesthesia risk assessment prior to undergoing surgery/procedure for treatment of knee pain .  Proposed procedure: Right Total Knee.     Date of Surgery/ Procedure: 2018  Time of Surgery/ Procedure: [facility]  Hospital/Surgical Facility: [facility]  Primary Physician: Alban William  Type of Anesthesia Anticipated: to be determined    Patient has a Health Care Directive or Living Will:  NO    Preop Questions 2018   1.  Do you have a history of heart attack, stroke, stent, bypass or surgery on an artery in the head, neck, heart or legs? No   2.  Do you ever have any pain or discomfort in your chest? No   3.  Do you have a history of  Heart Failure? No   4.   Are you troubled by shortness of breath when:  walking on a level surface, or up a slight hill, or at night? No   5.  Do you currently have a cold, bronchitis or other respiratory infection? No   6.  Do you have a cough, shortness of breath, or wheezing? No   7.  Do you sometimes get pains in the calves of your legs when you walk? No   8. Do you or anyone in your family have previous history of blood clots? No   9.  Do you or does anyone in your family have a serious bleeding problem such as prolonged bleeding following surgeries or cuts? No   10. Have you ever had problems with anemia or been told to take iron pills? No   11. Have you had any abnormal blood loss such as black, tarry or bloody stools? No   12. Have you ever had a blood transfusion? No   13. Have you or any of your relatives ever had problems with anesthesia? No   14. Do you have sleep apnea, excessive snoring or daytime drowsiness? No   15. Do you have any prosthetic heart valves? No   16. Do  you have prosthetic joints? No           HPI:                                                      Brief HPI related to upcoming procedure: Brenden has a history of bilateral knee osteoarthritis  and has failed conservative management..  He was seen by Orthopedics  where it was felt the patient would benefit from surgical management.  I was asked to see him for preoperative medical clearance.      See problem list for active medical problems.  Problems all longstanding and stable, except as noted/documented.  See ROS for pertinent symptoms related to these conditions.                                                                                                  .    MEDICAL HISTORY:                                                    Patient Active Problem List    Diagnosis Date Noted     Primary osteoarthritis of both knees 11/20/2017     Priority: Medium     Morbid obesity (H) 10/01/2017     Priority: Medium     HTN (hypertension) 07/05/2016     Priority: Medium     ACP (advance care planning) 06/28/2016     Priority: Medium     Advance Care Planning 6/28/2016: ACP Review of Chart / Resources Provided:  Reviewed chart for advance care plan.  Josef Rod has no plan or code status on file. Discussed available resources and provided with information. Confirmed code status reflects current choices pending further ACP discussions.  Confirmed/documented legally designated decision makers.  Added by Reema Kumar             Gout 12/23/2014     Priority: Medium     Dyslipidemia 10/29/2013     Priority: Medium      Past Medical History:   Diagnosis Date     Dyslipidemia 2/6/2001     Obesity 8/21/2012     Past Surgical History:   Procedure Laterality Date     arthroscopy  2005    RT     ARTHROSCOPY KNEE  2005    LT     TONSILLECTOMY  19??     Current Outpatient Prescriptions   Medication Sig Dispense Refill     atorvastatin (LIPITOR) 40 MG tablet Take 0.5 tablets (20 mg) by mouth daily 45 tablet 0     amLODIPine  "(NORVASC) 5 MG tablet Take 1 tablet (5 mg) by mouth daily 30 tablet 8     Cholecalciferol (VITAMIN D) 2000 UNITS tablet Take 2,000 Units by mouth daily 100 tablet 3     aspirin 325 MG tablet Take 1 tablet by mouth daily       OTC products: None, except as noted above    Allergies   Allergen Reactions     Penicillins       Latex Allergy: NO    Social History   Substance Use Topics     Smoking status: Never Smoker     Smokeless tobacco: Never Used      Comment: no passive exposure     Alcohol use Yes      Comment: beer and liquor monthly     History   Drug Use No       REVIEW OF SYSTEMS:                                                    Constitutional, neuro, ENT, endocrine, pulmonary, cardiac, gastrointestinal, genitourinary, musculoskeletal, integument and psychiatric systems are negative, except as otherwise noted.      EXAM:                                                    /80  Pulse 61  Temp 97.8  F (36.6  C) (Tympanic)  Ht 5' 10\" (1.778 m)  Wt 257 lb (116.6 kg)  SpO2 98%  BMI 36.88 kg/m2    GENERAL APPEARANCE: healthy, alert and no distress     EYES: EOMI,  PERRL     HENT: ear canals and TM's normal and nose and mouth without ulcers or lesions     NECK: no adenopathy, no asymmetry, masses, or scars and thyroid normal to palpation     RESP: lungs clear to auscultation - no rales, rhonchi or wheezes     CV: regular rates and rhythm, normal S1 S2, no S3 or S4 and no murmur, click or rub     ABDOMEN:  soft, nontender, no HSM or masses and bowel sounds normal     MS: extremities normal- no gross deformities noted, no evidence of inflammation in joints, FROM in all extremities.     SKIN: no suspicious lesions or rashes     NEURO: Normal strength and tone, sensory exam grossly normal, mentation intact and speech normal     PSYCH: mentation appears normal. and affect normal/bright     LYMPHATICS: No axillary, cervical, or supraclavicular nodes    DIAGNOSTICS:                                                "       EKG: appears normal, NSR, normal axis, normal intervals, no acute ST/T changes c/w ischemia, no LVH by voltage criteria, unchanged from previous tracings  Labs Resulted Today:   Results for orders placed or performed in visit on 01/19/18   CBC with platelets differential   Result Value Ref Range    WBC 5.5 4.0 - 11.0 10e9/L    RBC Count 5.74 4.4 - 5.9 10e12/L    Hemoglobin 16.5 13.3 - 17.7 g/dL    Hematocrit 49.0 40.0 - 53.0 %    MCV 85 78 - 100 fl    MCH 28.7 26.5 - 33.0 pg    MCHC 33.7 31.5 - 36.5 g/dL    RDW 12.6 10.0 - 15.0 %    Platelet Count 252 150 - 450 10e9/L    Diff Method Automated Method     % Neutrophils 57.9 %    % Lymphocytes 30.3 %    % Monocytes 8.7 %    % Eosinophils 2.7 %    % Basophils 0.2 %    % Immature Granulocytes 0.2 %    Nucleated RBCs 0 0 /100    Absolute Neutrophil 3.2 1.6 - 8.3 10e9/L    Absolute Lymphocytes 1.7 0.8 - 5.3 10e9/L    Absolute Monocytes 0.5 0.0 - 1.3 10e9/L    Absolute Eosinophils 0.2 0.0 - 0.7 10e9/L    Absolute Basophils 0.0 0.0 - 0.2 10e9/L    Abs Immature Granulocytes 0.0 0 - 0.4 10e9/L    Absolute Nucleated RBC 0.0    Basic metabolic panel   Result Value Ref Range    Sodium 137 133 - 144 mmol/L    Potassium 4.1 3.4 - 5.3 mmol/L    Chloride 103 94 - 109 mmol/L    Carbon Dioxide 28 20 - 32 mmol/L    Anion Gap 6 3 - 14 mmol/L    Glucose 86 70 - 99 mg/dL    Urea Nitrogen 19 7 - 30 mg/dL    Creatinine 1.06 0.66 - 1.25 mg/dL    GFR Estimate 72 >60 mL/min/1.7m2    GFR Estimate If Black 87 >60 mL/min/1.7m2    Calcium 9.2 8.5 - 10.1 mg/dL   UA reflex to Microscopic and Culture   Result Value Ref Range    Color Urine Yellow     Appearance Urine Clear     Glucose Urine Negative NEG^Negative mg/dL    Bilirubin Urine Negative NEG^Negative    Ketones Urine Negative NEG^Negative mg/dL    Specific Gravity Urine 1.016 1.003 - 1.035    Blood Urine Negative NEG^Negative    pH Urine 6.5 4.7 - 8.0 pH    Protein Albumin Urine 10 (A) NEG^Negative mg/dL    Urobilinogen mg/dL Normal 0.0  - 2.0 mg/dL    Nitrite Urine Negative NEG^Negative    Leukocyte Esterase Urine Negative NEG^Negative    Source Midstream Urine     RBC Urine 1 0 - 2 /HPF    WBC Urine 1 0 - 2 /HPF    Bacteria Urine None (A) NEG^Negative /HPF    Mucous Urine Present (A) NEG^Negative /LPF    Hyaline Casts 6 (A) OTO2^O - 2 /LPF   Lipid Profile   Result Value Ref Range    Cholesterol 243 (H) <200 mg/dL    Triglycerides 171 (H) <150 mg/dL    HDL Cholesterol 44 >39 mg/dL    LDL Cholesterol Calculated 165 (H) <100 mg/dL    Non HDL Cholesterol 199 (H) <130 mg/dL       Recent Labs   Lab Test  11/20/17   1241  09/26/17   1127   HGB  16.6  16.2   PLT  242  268   INR  1.02   --    NA  138  137   POTASSIUM  4.3  4.9   CR  0.98  1.04        IMPRESSION:                                                    Reason for surgery/procedure: Osteoarthritis right knee    The proposed surgical procedure is considered INTERMEDIATE risk.    REVISED CARDIAC RISK INDEX  The patient has the following serious cardiovascular risks for perioperative complications such as (MI, PE, VFib and 3  AV Block):  No serious cardiac risks  INTERPRETATION: 0 risks: Class I (very low risk - 0.4% complication rate)    The patient has the following additional risks for perioperative complications:  No identified additional risks    No diagnosis found.    RECOMMENDATIONS:                                                        APPROVAL GIVEN to proceed with proposed procedure, without further diagnostic evaluation       Signed Electronically by: Alban William MD    Copy of this evaluation report is provided to requesting physician.    [place] Preop Guidelines

## 2018-01-19 NOTE — NURSING NOTE
"Chief Complaint   Patient presents with     Pre-Op Exam     01/30/2018-Dr. Bean-Rt knee- [facility]       Initial /80  Pulse 61  Temp 97.8  F (36.6  C) (Tympanic)  Ht 5' 10\" (1.778 m)  Wt 257 lb (116.6 kg)  SpO2 98%  BMI 36.88 kg/m2 Estimated body mass index is 36.88 kg/(m^2) as calculated from the following:    Height as of this encounter: 5' 10\" (1.778 m).    Weight as of this encounter: 257 lb (116.6 kg).  Medication Reconciliation: complete   HARISH MORTON LPN  "

## 2018-01-22 ENCOUNTER — TELEPHONE (OUTPATIENT)
Dept: FAMILY MEDICINE | Facility: OTHER | Age: 58
End: 2018-01-22

## 2018-01-23 ASSESSMENT — ANXIETY QUESTIONNAIRES: GAD7 TOTAL SCORE: 0

## 2018-02-16 ENCOUNTER — TRANSFERRED RECORDS (OUTPATIENT)
Dept: HEALTH INFORMATION MANAGEMENT | Facility: HOSPITAL | Age: 58
End: 2018-02-16

## 2018-03-01 ENCOUNTER — OFFICE VISIT (OUTPATIENT)
Dept: CHIROPRACTIC MEDICINE | Facility: OTHER | Age: 58
End: 2018-03-01
Attending: CHIROPRACTOR
Payer: COMMERCIAL

## 2018-03-01 DIAGNOSIS — M99.02 SEGMENTAL AND SOMATIC DYSFUNCTION OF THORACIC REGION: ICD-10-CM

## 2018-03-01 DIAGNOSIS — M99.03 SEGMENTAL AND SOMATIC DYSFUNCTION OF LUMBAR REGION: Primary | ICD-10-CM

## 2018-03-01 DIAGNOSIS — M54.50 ACUTE RIGHT-SIDED LOW BACK PAIN WITHOUT SCIATICA: ICD-10-CM

## 2018-03-01 PROCEDURE — 98940 CHIROPRACT MANJ 1-2 REGIONS: CPT | Mod: AT | Performed by: CHIROPRACTOR

## 2018-03-01 PROCEDURE — 99201 ZZC OFFICE/OUTPT VISIT, NEW, LEVEL I: CPT | Mod: 25 | Performed by: CHIROPRACTOR

## 2018-03-01 NOTE — MR AVS SNAPSHOT
After Visit Summary   3/1/2018    Josef Rod    MRN: [id number]           Patient Information     Date Of Birth          1960        Visit Information        Provider Department      3/1/2018 3:10 PM Jae Bagley DC  [facility] [place]        Today's Diagnoses     Segmental and somatic dysfunction of lumbar region    -  1    Acute right-sided low back pain without sciatica        Segmental and somatic dysfunction of thoracic region           Follow-ups after your visit        Who to contact     If you have questions or need follow up information about today's clinic visit or your schedule please contact  [facility] directly at [phone].  Normal or non-critical lab and imaging results will be communicated to you by [employer], letter or phone within 4 business days after the clinic has received the results. If you do not hear from us within 7 days, please contact the clinic through [employer] or phone. If you have a critical or abnormal lab result, we will notify you by phone as soon as possible.  Submit refill requests through [employer] or call your pharmacy and they will forward the refill request to us. Please allow 3 business days for your refill to be completed.          Additional Information About Your Visit        MyChart Information     [employer] gives you secure access to your electronic health record. If you see a primary care provider, you can also send messages to your care team and make appointments. If you have questions, please call your primary care clinic.  If you do not have a primary care provider, please call [phone] and they will assist you.        Care EveryWhere ID     This is your Care EveryWhere ID. This could be used by other organizations to access your [facility] medical records  [id number]         Blood Pressure from Last 3 Encounters:   01/19/18 144/80   11/20/17 148/82   09/26/17 140/80    Weight from Last 3 Encounters:   01/19/18 257 lb  (116.6 kg)   11/20/17 260 lb (117.9 kg)   09/26/17 248 lb (112.5 kg)              We Performed the Following     CHIROPRAC MANIP,SPINAL,1-2 REGIONS        Primary Care Provider Office Phone # Fax #    Alban William [phone] [address] [address]        Equal Access to Services     [facility] REBECCA : Hadii aad ku hadasho Soomaali, waaxda luqadaha, qaybta kaalmada adeegyada, waxay idiin hayaan adeeg reddyabimboladenton lafernandon . So [facility] [phone].    ATENCIÓN: Si habla esptaran, tiene a genao disposición servicios gratuitos de asistencia lingüística. Leora al [phone].    We comply with applicable federal civil rights laws and [place] laws. We do not discriminate on the basis of race, color, national origin, age, disability, sex, sexual orientation, or gender identity.            Thank you!     Thank you for choosing  [facility]  for your care. Our goal is always to provide you with excellent care. Hearing back from our patients is one way we can continue to improve our services. Please take a few minutes to complete the written survey that you may receive in the mail after your visit with us. Thank you!             Your Updated Medication List - Protect others around you: Learn how to safely use, store and throw away your medicines at www.disposemymeds.org.          This list is accurate as of 3/1/18 11:59 PM.  Always use your most recent med list.                   Brand Name Dispense Instructions for use Diagnosis    amLODIPine 5 MG tablet    NORVASC    30 tablet    Take 1 tablet (5 mg) by mouth daily    Benign essential hypertension       aspirin 325 MG tablet      Take 1 tablet by mouth daily        atorvastatin 40 MG tablet    LIPITOR    45 tablet    Take 0.5 tablets (20 mg) by mouth daily    Dyslipidemia       vitamin D 2000 UNITS tablet     100 tablet    Take 2,000 Units by mouth daily    Vitamin D deficiency

## 2018-03-02 ENCOUNTER — TRANSFERRED RECORDS (OUTPATIENT)
Dept: HEALTH INFORMATION MANAGEMENT | Facility: CLINIC | Age: 58
End: 2018-03-02

## 2018-03-02 NOTE — PROGRESS NOTES
Subjective Finding:    Chief compalint: Patient presents with:  Back Pain: following knee surgery  , Pain Scale: 5/10, Intensity: sharp, Duration: 2 weeks, Radiating: no.    Date of injury:     Activities that the pain restricts:   Home/household/hobbies/social activities: yes.  Work duties: no.  Sleep: no.  Makes symptoms better: rest.  Makes symptoms worse: walking.  Have you seen anyone else for the symptoms? Yes: MD.  Work related: no.  Automobile related injury: no.    Objective and Assessment:    Posture Analysis:   High shoulder: .  Head tilt: .  High iliac crest: right.  Head carriage: neutral.  Thoracic Kyphosis: neutral.  Lumbar Lordosis: forward.    Lumbar Range of Motion: extension decreased, left lateral flexion decreased and right lateral flexion decreased.  Cervical Range of Motion: .  Thoracic Range of Motion: .  Extremity Range of Motion: .    Palpation:   Piriformis: right, referred pain: no  Quad lumb: right, referred pain: no    Segmental dysfunction pre-treatment and treatment area: T7, L5 and PSIS Right.    Assessment post-treatment:  Cervical: .  Thoracic: ROM increased.  Lumbar: ROM increased.    Comments: following right knee replacement.      Complicating Factors: .    Procedure(s):  CMT:  08592 Chiropractic manipulative treatment 1-2 regions performed   Thoracic: Diversified, See above for level, Prone and Lumbar: Diversified, See above for level, Side posture    Modalities:  None performed this visit    Therapeutic procedures:  None    Plan:  Treatment plan: 2 times per week for 2 weeks.  Instructed patient: stretch as instructed at visit.  Short term goals: reduce pain and increase ROM.  Long term goals: increase ADL.  Prognosis: very good.

## 2018-03-14 ENCOUNTER — TELEPHONE (OUTPATIENT)
Dept: FAMILY MEDICINE | Facility: OTHER | Age: 58
End: 2018-03-14

## 2018-03-14 DIAGNOSIS — R60.0 LOCALIZED EDEMA: Primary | ICD-10-CM

## 2018-03-14 NOTE — TELEPHONE ENCOUNTER
[facility] at [facility] called looking for Occupational Therapy orders for patient.   did not place this but patient stated that he said he would.  He is scheduled to be seen this Friday 3/16/18 at 12:00.  Could you please pend orders and send to  to be signed, I will let her know to expect them.    Thank you

## 2018-03-27 ENCOUNTER — TELEPHONE (OUTPATIENT)
Dept: FAMILY MEDICINE | Facility: OTHER | Age: 58
End: 2018-03-27

## 2018-03-27 DIAGNOSIS — M17.0 PRIMARY OSTEOARTHRITIS OF BOTH KNEES: Primary | ICD-10-CM

## 2018-03-27 NOTE — TELEPHONE ENCOUNTER
2:28 PM    Reason for Call: Phone Call    Description: Pt called and stated he needs an order for compression socks sent to [facility] in [place]. Please call pt back at [phone]    Was an appointment offered for this call? No  If yes : Appointment type              Date    Preferred method for responding to this message: Telephone Call  What is your phone number ?    If we cannot reach you directly, may we leave a detailed response at the number you provided? Yes    Can this message wait until your PCP/provider returns, if available today? Not applicable    Lucia Abarca

## 2018-03-30 ENCOUNTER — TRANSFERRED RECORDS (OUTPATIENT)
Dept: HEALTH INFORMATION MANAGEMENT | Facility: CLINIC | Age: 58
End: 2018-03-30

## 2018-04-18 ENCOUNTER — TRANSFERRED RECORDS (OUTPATIENT)
Dept: HEALTH INFORMATION MANAGEMENT | Facility: CLINIC | Age: 58
End: 2018-04-18

## 2018-05-11 ENCOUNTER — TRANSFERRED RECORDS (OUTPATIENT)
Dept: HEALTH INFORMATION MANAGEMENT | Facility: CLINIC | Age: 58
End: 2018-05-11

## 2018-06-08 ENCOUNTER — TRANSFERRED RECORDS (OUTPATIENT)
Dept: HEALTH INFORMATION MANAGEMENT | Facility: CLINIC | Age: 58
End: 2018-06-08

## 2018-06-29 ENCOUNTER — TRANSFERRED RECORDS (OUTPATIENT)
Dept: HEALTH INFORMATION MANAGEMENT | Facility: CLINIC | Age: 58
End: 2018-06-29

## 2018-08-09 ENCOUNTER — TELEPHONE (OUTPATIENT)
Dept: FAMILY MEDICINE | Facility: OTHER | Age: 58
End: 2018-08-09

## 2018-08-09 DIAGNOSIS — M72.2 PLANTAR FASCIITIS: Primary | ICD-10-CM

## 2018-08-09 NOTE — TELEPHONE ENCOUNTER
11:25 AM    Reason for Call: Phone Call    Description: Pt called and states that he would like to see if he could get a call back regarding some medications.     Was an appointment offered for this call? No  If yes : Appointment type              Date    Preferred method for responding to this message: Telephone Call  What is your phone number ?[phone]    If we cannot reach you directly, may we leave a detailed response at the number you provided? Yes    Can this message wait until your PCP/provider returns, if available today? No,PCP is out    Ruthann Gonzalez

## 2018-08-09 NOTE — TELEPHONE ENCOUNTER
PCP is out, patient is requesting a order to be sent to [facility] for a planter fascitis boot. Order pending   JUJU BOLAND

## 2018-10-08 NOTE — PROGRESS NOTES
SUBJECTIVE:   Josef Rod is a 58 year old male who presents to clinic today for the following health issues:    Musculoskeletal problem/pain      Duration: 3 months    Description  Location: plantar facitis    Intensity:  moderate    Accompanying signs and symptoms: none    History  Previous similar problem: no   Previous evaluation:  none    Precipitating or alleviating factors:  Trauma or overuse: no   Aggravating factors include: none    Therapies tried and outcome: nothing      Hyperlipidemia Follow-Up      Rate your low fat/cholesterol diet?: good    Taking statin?  Yes, no muscle aches from statin    Other lipid medications/supplements?:  none    Hypertension Follow-up      Outpatient blood pressures are being checked at home.  Results are morales.    Low Salt Diet: no added salt      Amount of exercise or physical activity: 2-3 days/week for an average of 15-30 minutes    Problems taking medications regularly: No    Medication side effects: none    Diet: carbohydrate counting         Problem list and histories reviewed & adjusted, as indicated.  Additional history: as documented    Patient Active Problem List   Diagnosis     Dyslipidemia     Gout     ACP (advance care planning)     HTN (hypertension)     Morbid obesity (H)     Primary osteoarthritis of both knees     Past Surgical History:   Procedure Laterality Date     arthroscopy  2005    RT     ARTHROSCOPY KNEE  2005    LT     TONSILLECTOMY  19??       Social History   Substance Use Topics     Smoking status: Never Smoker     Smokeless tobacco: Never Used      Comment: no passive exposure     Alcohol use Yes      Comment: beer and liquor monthly     Family History   Problem Relation Age of Onset     Arthritis Mother      HEART DISEASE Father          Current Outpatient Prescriptions   Medication Sig Dispense Refill     amLODIPine (NORVASC) 5 MG tablet TAKE 1 TABLET (5 MG) BY MOUTH DAILY 30 tablet 4     aspirin 325 MG tablet Take 1 tablet by mouth  "daily       atorvastatin (LIPITOR) 40 MG tablet Take 0.5 tablets (20 mg) by mouth daily 45 tablet 0     Cholecalciferol (VITAMIN D) 2000 UNITS tablet Take 2,000 Units by mouth daily 100 tablet 3     order for DME Equipment being ordered: Plantmanjit Fascitis Nightime Boot 1 Device 1     order for DME Equipment being ordered: compression stockings  Size to fit 1 each 0     Allergies   Allergen Reactions     Penicillins      BP Readings from Last 3 Encounters:   10/09/18 144/76   01/19/18 144/80   11/20/17 148/82    Wt Readings from Last 3 Encounters:   10/09/18 257 lb (116.6 kg)   01/19/18 257 lb (116.6 kg)   11/20/17 260 lb (117.9 kg)                    Reviewed and updated as needed this visit by clinical staff  Allergies  Meds  Problems       Reviewed and updated as needed this visit by Provider         ROS:  Constitutional, HEENT, cardiovascular, pulmonary, gi and gu systems are negative, except as otherwise noted.    OBJECTIVE:     /76 (BP Location: Right arm, Patient Position: Sitting, Cuff Size: Adult Large)  Pulse 61  Temp 98.4  F (36.9  C) (Tympanic)  Resp 16  Ht 5' 10\" (1.778 m)  Wt 257 lb (116.6 kg)  SpO2 97%  BMI 36.88 kg/m2  Body mass index is 36.88 kg/(m^2).  Physical Exam   Constitutional: He is oriented to person, place, and time. He appears well-developed and well-nourished. No distress.   Musculoskeletal:   Examination of the left foot shows tenderness over the insertion of the plantar fascia left foot.  Neurovascularly the extremity is intact.   Neurological: He is alert and oriented to person, place, and time.   Psychiatric: He has a normal mood and affect.         Diagnostic Test Results:  No results found for this or any previous visit (from the past 24 hour(s)).    ASSESSMENT/PLAN:     Plantar fasciitis  Custom orthotics written.  Continue taping and home exercises.  - order for DME; Equipment being ordered: custom orthotics    HTN (hypertension)  Goals reviewed.  Continue home BP " monitoring and same medication regimen.  Follow up 6 months.     Dyslipidemia  Fasting labs reviewed.  Goals discussed.  Discussed the importance of diet, exercise, and weight reduction.  Follow up 12 months.   - CBC with platelets differential  - Comprehensive metabolic panel  - Lipid Profile    Primary osteoarthritis of both knees  Continue compression stockings.  - order for DME; Equipment being ordered: compression stockings  Size to fit    Alban William MD  [facility] [place]

## 2018-10-09 ENCOUNTER — OFFICE VISIT (OUTPATIENT)
Dept: FAMILY MEDICINE | Facility: OTHER | Age: 58
End: 2018-10-09
Attending: FAMILY MEDICINE
Payer: COMMERCIAL

## 2018-10-09 VITALS
SYSTOLIC BLOOD PRESSURE: 144 MMHG | RESPIRATION RATE: 16 BRPM | DIASTOLIC BLOOD PRESSURE: 76 MMHG | BODY MASS INDEX: 36.79 KG/M2 | TEMPERATURE: 98.4 F | OXYGEN SATURATION: 97 % | HEART RATE: 61 BPM | HEIGHT: 70 IN | WEIGHT: 257 LBS

## 2018-10-09 DIAGNOSIS — M72.2 PLANTAR FASCIITIS: ICD-10-CM

## 2018-10-09 DIAGNOSIS — E78.2 MIXED HYPERLIPIDEMIA: ICD-10-CM

## 2018-10-09 DIAGNOSIS — M17.0 PRIMARY OSTEOARTHRITIS OF BOTH KNEES: ICD-10-CM

## 2018-10-09 DIAGNOSIS — I10 BENIGN ESSENTIAL HYPERTENSION: ICD-10-CM

## 2018-10-09 DIAGNOSIS — E78.5 DYSLIPIDEMIA: ICD-10-CM

## 2018-10-09 DIAGNOSIS — I10 BENIGN ESSENTIAL HYPERTENSION: Primary | ICD-10-CM

## 2018-10-09 LAB
ALBUMIN SERPL-MCNC: 4.5 G/DL (ref 3.4–5)
ALP SERPL-CCNC: 75 U/L (ref 40–150)
ALT SERPL W P-5'-P-CCNC: 51 U/L (ref 0–70)
ANION GAP SERPL CALCULATED.3IONS-SCNC: 4 MMOL/L (ref 3–14)
AST SERPL W P-5'-P-CCNC: 25 U/L (ref 0–45)
BASOPHILS # BLD AUTO: 0 10E9/L (ref 0–0.2)
BASOPHILS NFR BLD AUTO: 0.2 %
BILIRUB SERPL-MCNC: 0.9 MG/DL (ref 0.2–1.3)
BUN SERPL-MCNC: 21 MG/DL (ref 7–30)
CALCIUM SERPL-MCNC: 8.9 MG/DL (ref 8.5–10.1)
CHLORIDE SERPL-SCNC: 101 MMOL/L (ref 94–109)
CHOLEST SERPL-MCNC: 142 MG/DL
CO2 SERPL-SCNC: 29 MMOL/L (ref 20–32)
CREAT SERPL-MCNC: 1.09 MG/DL (ref 0.66–1.25)
DIFFERENTIAL METHOD BLD: NORMAL
EOSINOPHIL # BLD AUTO: 0.2 10E9/L (ref 0–0.7)
EOSINOPHIL NFR BLD AUTO: 2.8 %
ERYTHROCYTE [DISTWIDTH] IN BLOOD BY AUTOMATED COUNT: 12.6 % (ref 10–15)
GFR SERPL CREATININE-BSD FRML MDRD: 70 ML/MIN/1.7M2
GLUCOSE SERPL-MCNC: 89 MG/DL (ref 70–99)
HCT VFR BLD AUTO: 48.2 % (ref 40–53)
HDLC SERPL-MCNC: 44 MG/DL
HGB BLD-MCNC: 16.4 G/DL (ref 13.3–17.7)
IMM GRANULOCYTES # BLD: 0 10E9/L (ref 0–0.4)
IMM GRANULOCYTES NFR BLD: 0.2 %
LDLC SERPL CALC-MCNC: 73 MG/DL
LYMPHOCYTES # BLD AUTO: 1.8 10E9/L (ref 0.8–5.3)
LYMPHOCYTES NFR BLD AUTO: 34.4 %
MCH RBC QN AUTO: 28.4 PG (ref 26.5–33)
MCHC RBC AUTO-ENTMCNC: 34 G/DL (ref 31.5–36.5)
MCV RBC AUTO: 84 FL (ref 78–100)
MONOCYTES # BLD AUTO: 0.6 10E9/L (ref 0–1.3)
MONOCYTES NFR BLD AUTO: 10.3 %
NEUTROPHILS # BLD AUTO: 2.8 10E9/L (ref 1.6–8.3)
NEUTROPHILS NFR BLD AUTO: 52.1 %
NONHDLC SERPL-MCNC: 98 MG/DL
NRBC # BLD AUTO: 0 10*3/UL
NRBC BLD AUTO-RTO: 0 /100
PLATELET # BLD AUTO: 237 10E9/L (ref 150–450)
POTASSIUM SERPL-SCNC: 3.9 MMOL/L (ref 3.4–5.3)
PROT SERPL-MCNC: 7.9 G/DL (ref 6.8–8.8)
RBC # BLD AUTO: 5.77 10E12/L (ref 4.4–5.9)
SODIUM SERPL-SCNC: 134 MMOL/L (ref 133–144)
TRIGL SERPL-MCNC: 123 MG/DL
WBC # BLD AUTO: 5.3 10E9/L (ref 4–11)

## 2018-10-09 PROCEDURE — 36415 COLL VENOUS BLD VENIPUNCTURE: CPT | Performed by: FAMILY MEDICINE

## 2018-10-09 PROCEDURE — 85025 COMPLETE CBC W/AUTO DIFF WBC: CPT | Performed by: FAMILY MEDICINE

## 2018-10-09 PROCEDURE — 99214 OFFICE O/P EST MOD 30 MIN: CPT | Performed by: FAMILY MEDICINE

## 2018-10-09 PROCEDURE — 80061 LIPID PANEL: CPT | Performed by: FAMILY MEDICINE

## 2018-10-09 PROCEDURE — 80053 COMPREHEN METABOLIC PANEL: CPT | Performed by: FAMILY MEDICINE

## 2018-10-09 RX ORDER — AMLODIPINE BESYLATE 5 MG/1
TABLET ORAL
Qty: 90 TABLET | Refills: 3 | Status: SHIPPED | OUTPATIENT
Start: 2018-10-09 | End: 2019-07-11

## 2018-10-09 RX ORDER — ATORVASTATIN CALCIUM 40 MG/1
TABLET, FILM COATED ORAL
Qty: 90 TABLET | Refills: 1 | Status: SHIPPED | OUTPATIENT
Start: 2018-10-09 | End: 2019-04-09

## 2018-10-09 ASSESSMENT — ANXIETY QUESTIONNAIRES
GAD7 TOTAL SCORE: 0
5. BEING SO RESTLESS THAT IT IS HARD TO SIT STILL: NOT AT ALL
IF YOU CHECKED OFF ANY PROBLEMS ON THIS QUESTIONNAIRE, HOW DIFFICULT HAVE THESE PROBLEMS MADE IT FOR YOU TO DO YOUR WORK, TAKE CARE OF THINGS AT HOME, OR GET ALONG WITH OTHER PEOPLE: NOT DIFFICULT AT ALL
4. TROUBLE RELAXING: NOT AT ALL
2. NOT BEING ABLE TO STOP OR CONTROL WORRYING: NOT AT ALL
7. FEELING AFRAID AS IF SOMETHING AWFUL MIGHT HAPPEN: NOT AT ALL
6. BECOMING EASILY ANNOYED OR IRRITABLE: NOT AT ALL
3. WORRYING TOO MUCH ABOUT DIFFERENT THINGS: NOT AT ALL
1. FEELING NERVOUS, ANXIOUS, OR ON EDGE: NOT AT ALL

## 2018-10-09 ASSESSMENT — PAIN SCALES - GENERAL: PAINLEVEL: MILD PAIN (3)

## 2018-10-09 NOTE — MR AVS SNAPSHOT
After Visit Summary   10/9/2018    Josef Rod    MRN: [id number]           Patient Information     Date Of Birth          1960        Visit Information        Provider Department      10/9/2018 8:40 AM Alban William MD [facility]        Today's Diagnoses     HTN (hypertension)    -  1    Plantar fasciitis        Dyslipidemia        Primary osteoarthritis of both knees        Dyslipidemia        Benign essential hypertension          Care Instructions    Continue same medications.            Follow-ups after your visit        Follow-up notes from your care team     Return in about 6 months (around 4/9/2019) for follow up visit hypertension.      Who to contact     If you have questions or need follow up information about today's clinic visit or your schedule please contact [facility] directly at [phone].  Normal or non-critical lab and imaging results will be communicated to you by MyChart, letter or phone within 4 business days after the clinic has received the results. If you do not hear from us within 7 days, please contact the clinic through MyChart or phone. If you have a critical or abnormal lab result, we will notify you by phone as soon as possible.  Submit refill requests through [employer] or call your pharmacy and they will forward the refill request to us. Please allow 3 business days for your refill to be completed.          Additional Information About Your Visit        MyChart Information     [employer] gives you secure access to your electronic health record. If you see a primary care provider, you can also send messages to your care team and make appointments. If you have questions, please call your primary care clinic.  If you do not have a primary care provider, please call [phone] and they will assist you.        Care EveryWhere ID     This is your Care EveryWhere ID. This could be used by other organizations to  "access your [facility] medical records  [id number]        Your Vitals Were     Pulse Temperature Respirations Height Pulse Oximetry BMI (Body Mass Index)    61 98.4  F (36.9  C) (Tympanic) 16 5' 10\" (1.778 m) 97% 36.88 kg/m2       Blood Pressure from Last 3 Encounters:   10/09/18 144/76   01/19/18 144/80   11/20/17 148/82    Weight from Last 3 Encounters:   10/09/18 257 lb (116.6 kg)   01/19/18 257 lb (116.6 kg)   11/20/17 260 lb (117.9 kg)              We Performed the Following     CBC with platelets differential     Comprehensive metabolic panel     Lipid Profile          Today's Medication Changes          These changes are accurate as of 10/9/18  9:08 AM.  If you have any questions, ask your nurse or doctor.               Start taking these medicines.        Dose/Directions    order for DME   Used for:  Plantar fasciitis        Equipment being ordered: custom orthotics   Quantity:  1 Device   Refills:  0            Where to get your medicines      These medications were sent to [facility]  [place] - [address] [place] [address]     Phone:  [phone]     amLODIPine 5 MG tablet    atorvastatin 40 MG tablet         Some of these will need a paper prescription and others can be bought over the counter.  Ask your nurse if you have questions.     Bring a paper prescription for each of these medications     order for DME    order for DME                Primary Care Provider Office Phone # Fax #    Alban William [phone]       [facility] [address]        Equal Access to Services     [facility] AH: Hadii gibran alatorre Sovictor manuel, waaxda luqadaha, qaybta kaalmada justin, dani jasmine. So [facility] [phone].    ATENCIÓN: Si habla español, tiene a genao disposición servicios gratuitos de asistencia lingüística. Llame al [phone].    We comply with applicable federal civil rights laws and " [place] laws. We do not discriminate on the basis of race, color, national origin, age, disability, sex, sexual orientation, or gender identity.            Thank you!     Thank you for choosing [facility]  for your care. Our goal is always to provide you with excellent care. Hearing back from our patients is one way we can continue to improve our services. Please take a few minutes to complete the written survey that you may receive in the mail after your visit with us. Thank you!             Your Updated Medication List - Protect others around you: Learn how to safely use, store and throw away your medicines at www.disposemymeds.org.          This list is accurate as of 10/9/18  9:08 AM.  Always use your most recent med list.                   Brand Name Dispense Instructions for use Diagnosis    amLODIPine 5 MG tablet    NORVASC    90 tablet    TAKE 1 TABLET (5 MG) BY MOUTH DAILY    Benign essential hypertension       aspirin 325 MG tablet      Take 1 tablet by mouth daily        atorvastatin 40 MG tablet    LIPITOR    90 tablet    Take 0.5 tablets (20 mg) by mouth daily    Dyslipidemia       order for DME     1 Device    Equipment being ordered: Planter Fascitis Nightime Boot    Plantar fasciitis       order for DME     1 each    Equipment being ordered: compression stockings  Size to fit    Primary osteoarthritis of both knees       order for DME     1 Device    Equipment being ordered: custom orthotics    Plantar fasciitis       vitamin D 2000 units tablet     100 tablet    Take 2,000 Units by mouth daily    Vitamin D deficiency

## 2018-10-09 NOTE — NURSING NOTE
"Chief Complaint   Patient presents with     Musculoskeletal Problem       Initial /76 (BP Location: Right arm, Patient Position: Sitting, Cuff Size: Adult Large)  Pulse 61  Temp 98.4  F (36.9  C) (Tympanic)  Resp 16  Ht 5' 10\" (1.778 m)  Wt 257 lb (116.6 kg)  SpO2 97%  BMI 36.88 kg/m2 Estimated body mass index is 36.88 kg/(m^2) as calculated from the following:    Height as of this encounter: 5' 10\" (1.778 m).    Weight as of this encounter: 257 lb (116.6 kg).  Medication Reconciliation: complete    Betty Taylor LPN    "

## 2018-10-10 ASSESSMENT — ANXIETY QUESTIONNAIRES: GAD7 TOTAL SCORE: 0

## 2018-10-10 ASSESSMENT — PATIENT HEALTH QUESTIONNAIRE - PHQ9: SUM OF ALL RESPONSES TO PHQ QUESTIONS 1-9: 0

## 2019-05-17 ENCOUNTER — MYC MEDICAL ADVICE (OUTPATIENT)
Dept: FAMILY MEDICINE | Facility: OTHER | Age: 59
End: 2019-05-17

## 2019-05-17 DIAGNOSIS — E78.2 MIXED HYPERLIPIDEMIA: Primary | ICD-10-CM

## 2019-05-24 DIAGNOSIS — E78.2 MIXED HYPERLIPIDEMIA: ICD-10-CM

## 2019-05-24 LAB
CHOLEST SERPL-MCNC: 117 MG/DL
HDLC SERPL-MCNC: 50 MG/DL
LDLC SERPL CALC-MCNC: 58 MG/DL
NONHDLC SERPL-MCNC: 67 MG/DL
TRIGL SERPL-MCNC: 43 MG/DL

## 2019-05-24 PROCEDURE — 80061 LIPID PANEL: CPT | Performed by: FAMILY MEDICINE

## 2019-05-24 PROCEDURE — 36415 COLL VENOUS BLD VENIPUNCTURE: CPT | Performed by: FAMILY MEDICINE

## 2019-07-11 DIAGNOSIS — I10 BENIGN ESSENTIAL HYPERTENSION: ICD-10-CM

## 2019-07-11 RX ORDER — AMLODIPINE BESYLATE 5 MG/1
TABLET ORAL
Qty: 90 TABLET | Refills: 0 | Status: SHIPPED | OUTPATIENT
Start: 2019-07-11 | End: 2020-01-08

## 2019-07-11 NOTE — TELEPHONE ENCOUNTER
amLODIPine (NORVASC) 5 MG tablet     Last Written Prescription Date:  10/9/18  Last Fill Quantity: 90,   # refills: 3  Last Office Visit: 10/9/18  Future Office visit:

## 2019-09-27 DIAGNOSIS — M17.0 PRIMARY OSTEOARTHRITIS OF BOTH KNEES: ICD-10-CM

## 2019-09-27 DIAGNOSIS — M72.2 PLANTAR FASCIITIS: ICD-10-CM

## 2020-01-06 DIAGNOSIS — I10 BENIGN ESSENTIAL HYPERTENSION: ICD-10-CM

## 2020-01-06 NOTE — LETTER
January 8, 2020      Josef Rod  [address] [address]        Dear Josef,       APPOINTMENT REMINDER:   Our records indicates that it is time for you to be seen for an appointment.     Your current medication request for amlodipine will be approved for one refill but you will need to be seen before any additional refills can be approved.  Taking care of your health is important to us, and ongoing visits with your provider are vital to your care.    We look forward to seeing you in the near future.  You may call our office at [phone] to schedule a visit.     Please disregard this notice if you have already made an appointment.    Sincerely,        Alban William MD

## 2020-01-08 RX ORDER — AMLODIPINE BESYLATE 5 MG/1
TABLET ORAL
Qty: 90 TABLET | Refills: 0 | Status: SHIPPED | OUTPATIENT
Start: 2020-01-08 | End: 2020-03-18

## 2020-03-02 ENCOUNTER — HEALTH MAINTENANCE LETTER (OUTPATIENT)
Age: 60
End: 2020-03-02

## 2020-03-18 DIAGNOSIS — I10 BENIGN ESSENTIAL HYPERTENSION: ICD-10-CM

## 2020-03-18 RX ORDER — AMLODIPINE BESYLATE 5 MG/1
5 TABLET ORAL DAILY
Qty: 90 TABLET | Refills: 1 | Status: SHIPPED | OUTPATIENT
Start: 2020-03-18 | End: 2020-07-06

## 2020-07-01 DIAGNOSIS — I10 BENIGN ESSENTIAL HYPERTENSION: ICD-10-CM

## 2020-07-01 NOTE — TELEPHONE ENCOUNTER
norvasc      Last Written Prescription Date:  3.18.2020  Last Fill Quantity: 90,   # refills: 1  Last Office Visit: 10.08.19

## 2020-07-01 NOTE — LETTER
July 6, 2020      Josef Rod  [address] [facility] [address]        Dear Josef,     APPOINTMENT REMINDER:   Our records indicates that it is time for you to be seen for an annual physical.     Your current medication request for amlodipine will be approved for one refill but you will need to be seen before any additional refills can be approved.  Taking care of your health is important to us, and ongoing visits with your provider are vital to your care.    We look forward to seeing you in the near future.  You may call our office at [phone] to schedule a visit.     Please disregard this notice if you have already made an appointment.      Sincerely,        Alban William MD

## 2020-07-06 RX ORDER — AMLODIPINE BESYLATE 5 MG/1
5 TABLET ORAL DAILY
Qty: 90 TABLET | Refills: 1 | Status: SHIPPED | OUTPATIENT
Start: 2020-07-06 | End: 2020-08-28

## 2020-07-06 NOTE — TELEPHONE ENCOUNTER
Failed protocol due to    Blood pressure under 140/90 in past 12 months Protocol Details    Recent (12 mo) or future (30 days) visit within the authorizing provider's specialty     Normal serum creatinine on file in past 12 months      Letter sent to schedule an appointment and future labs pended. Please advise, thank you.

## 2020-08-19 ENCOUNTER — APPOINTMENT (OUTPATIENT)
Dept: CT IMAGING | Facility: HOSPITAL | Age: 60
End: 2020-08-19
Attending: EMERGENCY MEDICINE
Payer: COMMERCIAL

## 2020-08-19 ENCOUNTER — HOSPITAL ENCOUNTER (EMERGENCY)
Facility: HOSPITAL | Age: 60
Discharge: HOME | End: 2020-08-20
Attending: EMERGENCY MEDICINE | Admitting: EMERGENCY MEDICINE
Payer: COMMERCIAL

## 2020-08-19 DIAGNOSIS — N20.0 KIDNEY STONE: ICD-10-CM

## 2020-08-19 LAB
ALBUMIN SERPL-MCNC: 4.5 G/DL (ref 3.4–5)
ALBUMIN UR-MCNC: 10 MG/DL
ALP SERPL-CCNC: 67 U/L (ref 40–150)
ALT SERPL W P-5'-P-CCNC: 36 U/L (ref 0–70)
ANION GAP SERPL CALCULATED.3IONS-SCNC: 6 MMOL/L (ref 3–14)
APPEARANCE UR: CLEAR
AST SERPL W P-5'-P-CCNC: 22 U/L (ref 0–45)
BACTERIA #/AREA URNS HPF: ABNORMAL /HPF
BASOPHILS # BLD AUTO: 0 10E9/L (ref 0–0.2)
BASOPHILS NFR BLD AUTO: 0.2 %
BILIRUB SERPL-MCNC: 0.6 MG/DL (ref 0.2–1.3)
BILIRUB UR QL STRIP: NEGATIVE
BUN SERPL-MCNC: 35 MG/DL (ref 7–30)
CALCIUM SERPL-MCNC: 8.9 MG/DL (ref 8.5–10.1)
CHLORIDE SERPL-SCNC: 107 MMOL/L (ref 94–109)
CO2 SERPL-SCNC: 26 MMOL/L (ref 20–32)
COLOR UR AUTO: ABNORMAL
CREAT SERPL-MCNC: 1.32 MG/DL (ref 0.66–1.25)
DIFFERENTIAL METHOD BLD: NORMAL
EOSINOPHIL # BLD AUTO: 0.1 10E9/L (ref 0–0.7)
EOSINOPHIL NFR BLD AUTO: 1.2 %
ERYTHROCYTE [DISTWIDTH] IN BLOOD BY AUTOMATED COUNT: 13.1 % (ref 10–15)
GFR SERPL CREATININE-BSD FRML MDRD: 59 ML/MIN/{1.73_M2}
GLUCOSE SERPL-MCNC: 104 MG/DL (ref 70–99)
GLUCOSE UR STRIP-MCNC: NEGATIVE MG/DL
HCT VFR BLD AUTO: 46.9 % (ref 40–53)
HGB BLD-MCNC: 15.8 G/DL (ref 13.3–17.7)
HGB UR QL STRIP: ABNORMAL
IMM GRANULOCYTES # BLD: 0 10E9/L (ref 0–0.4)
IMM GRANULOCYTES NFR BLD: 0.4 %
INR PPP: 1.08 (ref 0.86–1.14)
KETONES UR STRIP-MCNC: NEGATIVE MG/DL
LEUKOCYTE ESTERASE UR QL STRIP: NEGATIVE
LIPASE SERPL-CCNC: 204 U/L (ref 73–393)
LYMPHOCYTES # BLD AUTO: 1.4 10E9/L (ref 0.8–5.3)
LYMPHOCYTES NFR BLD AUTO: 17.2 %
MCH RBC QN AUTO: 28.8 PG (ref 26.5–33)
MCHC RBC AUTO-ENTMCNC: 33.7 G/DL (ref 31.5–36.5)
MCV RBC AUTO: 85 FL (ref 78–100)
MONOCYTES # BLD AUTO: 0.7 10E9/L (ref 0–1.3)
MONOCYTES NFR BLD AUTO: 8.9 %
NEUTROPHILS # BLD AUTO: 5.9 10E9/L (ref 1.6–8.3)
NEUTROPHILS NFR BLD AUTO: 72.1 %
NITRATE UR QL: NEGATIVE
NRBC # BLD AUTO: 0 10*3/UL
NRBC BLD AUTO-RTO: 0 /100
PH UR STRIP: 5.5 PH (ref 4.7–8)
PLATELET # BLD AUTO: 247 10E9/L (ref 150–450)
POTASSIUM SERPL-SCNC: 3.8 MMOL/L (ref 3.4–5.3)
PROT SERPL-MCNC: 8.1 G/DL (ref 6.8–8.8)
RBC # BLD AUTO: 5.49 10E12/L (ref 4.4–5.9)
RBC #/AREA URNS AUTO: 3 /HPF (ref 0–2)
SODIUM SERPL-SCNC: 139 MMOL/L (ref 133–144)
SOURCE: ABNORMAL
SP GR UR STRIP: 1.02 (ref 1–1.03)
UROBILINOGEN UR STRIP-MCNC: NORMAL MG/DL (ref 0–2)
WBC # BLD AUTO: 8.2 10E9/L (ref 4–11)
WBC #/AREA URNS AUTO: <1 /HPF (ref 0–5)

## 2020-08-19 PROCEDURE — 85025 COMPLETE CBC W/AUTO DIFF WBC: CPT | Performed by: EMERGENCY MEDICINE

## 2020-08-19 PROCEDURE — 99285 EMERGENCY DEPT VISIT HI MDM: CPT | Mod: 25

## 2020-08-19 PROCEDURE — 85610 PROTHROMBIN TIME: CPT

## 2020-08-19 PROCEDURE — 96361 HYDRATE IV INFUSION ADD-ON: CPT

## 2020-08-19 PROCEDURE — 80053 COMPREHEN METABOLIC PANEL: CPT | Performed by: EMERGENCY MEDICINE

## 2020-08-19 PROCEDURE — 83690 ASSAY OF LIPASE: CPT | Performed by: EMERGENCY MEDICINE

## 2020-08-19 PROCEDURE — 74176 CT ABD & PELVIS W/O CONTRAST: CPT | Mod: TC

## 2020-08-19 PROCEDURE — 25800030 ZZH RX IP 258 OP 636: Performed by: EMERGENCY MEDICINE

## 2020-08-19 PROCEDURE — 96374 THER/PROPH/DIAG INJ IV PUSH: CPT

## 2020-08-19 PROCEDURE — 99285 EMERGENCY DEPT VISIT HI MDM: CPT | Mod: Z6 | Performed by: EMERGENCY MEDICINE

## 2020-08-19 PROCEDURE — 96375 TX/PRO/DX INJ NEW DRUG ADDON: CPT

## 2020-08-19 PROCEDURE — 36415 COLL VENOUS BLD VENIPUNCTURE: CPT | Performed by: EMERGENCY MEDICINE

## 2020-08-19 PROCEDURE — 25000128 H RX IP 250 OP 636: Performed by: EMERGENCY MEDICINE

## 2020-08-19 PROCEDURE — 81001 URINALYSIS AUTO W/SCOPE: CPT | Performed by: EMERGENCY MEDICINE

## 2020-08-19 RX ORDER — SODIUM CHLORIDE 9 MG/ML
INJECTION, SOLUTION INTRAVENOUS CONTINUOUS
Status: DISCONTINUED | OUTPATIENT
Start: 2020-08-19 | End: 2020-08-20 | Stop reason: HOSPADM

## 2020-08-19 RX ORDER — ONDANSETRON 2 MG/ML
4 INJECTION INTRAMUSCULAR; INTRAVENOUS ONCE
Status: COMPLETED | OUTPATIENT
Start: 2020-08-19 | End: 2020-08-19

## 2020-08-19 RX ORDER — MORPHINE SULFATE 4 MG/ML
4 INJECTION, SOLUTION INTRAMUSCULAR; INTRAVENOUS ONCE
Status: COMPLETED | OUTPATIENT
Start: 2020-08-19 | End: 2020-08-19

## 2020-08-19 RX ORDER — MORPHINE SULFATE 4 MG/ML
4 INJECTION, SOLUTION INTRAMUSCULAR; INTRAVENOUS ONCE
Status: DISCONTINUED | OUTPATIENT
Start: 2020-08-19 | End: 2020-08-20 | Stop reason: HOSPADM

## 2020-08-19 RX ORDER — HYDROCODONE BITARTRATE AND ACETAMINOPHEN 5; 325 MG/1; MG/1
1 TABLET ORAL EVERY 6 HOURS PRN
Qty: 6 TABLET | Refills: 0 | Status: SHIPPED | OUTPATIENT
Start: 2020-08-19 | End: 2020-08-22

## 2020-08-19 RX ORDER — KETOROLAC TROMETHAMINE 30 MG/ML
30 INJECTION, SOLUTION INTRAMUSCULAR; INTRAVENOUS ONCE
Status: COMPLETED | OUTPATIENT
Start: 2020-08-19 | End: 2020-08-19

## 2020-08-19 RX ADMIN — KETOROLAC TROMETHAMINE 30 MG: 30 INJECTION, SOLUTION INTRAMUSCULAR at 21:35

## 2020-08-19 RX ADMIN — MORPHINE SULFATE 4 MG: 4 INJECTION, SOLUTION INTRAMUSCULAR; INTRAVENOUS at 21:39

## 2020-08-19 RX ADMIN — ONDANSETRON 4 MG: 2 INJECTION INTRAMUSCULAR; INTRAVENOUS at 21:46

## 2020-08-19 RX ADMIN — ONDANSETRON 4 MG: 2 INJECTION INTRAMUSCULAR; INTRAVENOUS at 21:39

## 2020-08-19 RX ADMIN — SODIUM CHLORIDE 1000 ML: 9 INJECTION, SOLUTION INTRAVENOUS at 21:37

## 2020-08-19 ASSESSMENT — ENCOUNTER SYMPTOMS
RESPIRATORY NEGATIVE: 1
ENDOCRINE NEGATIVE: 1
GASTROINTESTINAL NEGATIVE: 1
NEUROLOGICAL NEGATIVE: 1
CONSTITUTIONAL NEGATIVE: 1
CARDIOVASCULAR NEGATIVE: 1
PSYCHIATRIC NEGATIVE: 1
FLANK PAIN: 1
ALLERGIC/IMMUNOLOGIC NEGATIVE: 1
HEMATOLOGIC/LYMPHATIC NEGATIVE: 1
EYES NEGATIVE: 1
FEVER: 0

## 2020-08-19 NOTE — ED AVS SNAPSHOT
[facility]  [address]  [place] [address]  Phone:  [phone]                                    Josef Rod   MRN: [id number]    Department:  [facility]   Date of Visit:  8/19/2020           After Visit Summary Signature Page    I have received my discharge instructions, and my questions have been answered. I have discussed any challenges I see with this plan with the nurse or doctor.    ..........................................................................................................................................  Patient/Patient Representative Signature      ..........................................................................................................................................  Patient Representative Print Name and Relationship to Patient    ..................................................               ................................................  Date                                   Time    ..........................................................................................................................................  Reviewed by Signature/Title    ...................................................              ..............................................  Date                                               Time          22EPIC Rev 08/18

## 2020-08-20 ENCOUNTER — TELEPHONE (OUTPATIENT)
Dept: FAMILY MEDICINE | Facility: OTHER | Age: 60
End: 2020-08-20

## 2020-08-20 VITALS
TEMPERATURE: 98.8 F | OXYGEN SATURATION: 95 % | DIASTOLIC BLOOD PRESSURE: 78 MMHG | SYSTOLIC BLOOD PRESSURE: 148 MMHG | RESPIRATION RATE: 16 BRPM | HEART RATE: 63 BPM

## 2020-08-20 NOTE — ED NOTES
Pt presents with c/o sudden onset of severe sharp Lt flank pain that radiates around the flank, down abdomen and into the testicles and accompanied by nausea. Pt is pacing in the room and restless with the pain.

## 2020-08-20 NOTE — DISCHARGE INSTRUCTIONS
1) Follow the aftercare instructions provided.   2) You must have your kidney tests (BUN and creatinine) rechecked within the next 3 to 4 days.   3) You have been given a medication or prescription for a medication that can make you drowsy. Do not drink, drive, ride a bicycle or operate any heavy machinery while using this medication.   4) You have been given a prescription for a medication that can cause an allergic reactions. Return to the ER if you develop any itching, tongue swelling, wheezing or shortness of breath.  5) Follow up with your doctor in 12 to 24 hours.   6) If  you have not been contacted by the urology clinic by Monday, return to the ER for a referral

## 2020-08-20 NOTE — ED NOTES
Pt reports he is pain and nausea free. Pt states he is ready to go home. Pt and his wife are very appreciative of care provided. Hard copy of Norco and urology referral given to pt.  Discharge teaching done, AVS reviewed with pt, questions answered. Pt verbalized understanding and is agreeable with discharge plan. Pt discharged to home.

## 2020-08-20 NOTE — ED PROVIDER NOTES
History     Chief Complaint   Patient presents with     Flank Pain     left sided x2 hours. no hx of kidney stone. no hmeaturia. no dysuria.      HPI  Josef Rod is a 59 year old male who presents today with complaints of left flank pain.  Symptoms began approximately 2 hours ago while patient was driving.  Symptoms accompanied by nausea.  Symptoms sudden onset.  No recent trauma.  Patient otherwise in usual state of health.  No additional complaints at this time    Allergies:  Allergies   Allergen Reactions     Penicillins        Problem List:    Patient Active Problem List    Diagnosis Date Noted     Primary osteoarthritis of both knees 11/20/2017     Priority: Medium     Morbid obesity (H) 10/01/2017     Priority: Medium     HTN (hypertension) 07/05/2016     Priority: Medium     ACP (advance care planning) 06/28/2016     Priority: Medium     Advance Care Planning 6/28/2016: ACP Review of Chart / Resources Provided:  Reviewed chart for advance care plan.  Josef Rod has no plan or code status on file. Discussed available resources and provided with information. Confirmed code status reflects current choices pending further ACP discussions.  Confirmed/documented legally designated decision makers.  Added by Reema Kumar             Gout 12/23/2014     Priority: Medium     Dyslipidemia 10/29/2013     Priority: Medium        Past Medical History:    Past Medical History:   Diagnosis Date     Dyslipidemia 2/6/2001     Obesity 8/21/2012       Past Surgical History:    Past Surgical History:   Procedure Laterality Date     arthroscopy  2005    RT     ARTHROSCOPY KNEE  2005    LT     TONSILLECTOMY  19??       Family History:    Family History   Problem Relation Age of Onset     Arthritis Mother      Heart Disease Father        Social History:  Marital Status:   [2]  Social History     Tobacco Use     Smoking status: Never Smoker     Smokeless tobacco: Never Used     Tobacco comment: no  passive exposure   Substance Use Topics     Alcohol use: Yes     Comment: beer and liquor monthly     Drug use: No        Medications:    amLODIPine (NORVASC) 5 MG tablet  aspirin 325 MG tablet  Cholecalciferol (VITAMIN D) 2000 UNITS tablet  atorvastatin (LIPITOR) 20 MG tablet  order for DME  order for DME  order for DME          Review of Systems   Constitutional: Negative.  Negative for fever.   HENT: Negative.    Eyes: Negative.    Respiratory: Negative.    Cardiovascular: Negative.    Gastrointestinal: Negative.    Endocrine: Negative.    Genitourinary: Positive for flank pain. Negative for decreased urine volume, testicular pain and urgency.   Skin: Negative.    Allergic/Immunologic: Negative.    Neurological: Negative.    Hematological: Negative.    Psychiatric/Behavioral: Negative.        Physical Exam   BP: (!) 190/87  Pulse: 81  Temp: 97.7  F (36.5  C)  Resp: 18  SpO2: 96 %      Physical Exam  Constitutional:       General: He is not in acute distress.     Appearance: Normal appearance. He is normal weight. He is not toxic-appearing.   Cardiovascular:      Rate and Rhythm: Normal rate and regular rhythm.   Pulmonary:      Effort: Pulmonary effort is normal.   Abdominal:      General: Abdomen is flat.      Tenderness: There is no right CVA tenderness or left CVA tenderness.   Genitourinary:     Penis: Normal.       Scrotum/Testes: Normal.   Musculoskeletal: Normal range of motion.   Skin:     General: Skin is warm.      Capillary Refill: Capillary refill takes less than 2 seconds.   Neurological:      General: No focal deficit present.      Mental Status: He is alert.   Psychiatric:         Mood and Affect: Mood normal.         Behavior: Behavior normal.         Thought Content: Thought content normal.         Judgment: Judgment normal.         ED Course     ED Course as of Aug 19 2342   Wed Aug 19, 2020   2331 Patient feeling better.  Has no pain at this current time.        Procedures       CT of  abd/pelvis without contrast - Left UVJ stone approx 2mm. Left hydro nephrosis and hydroureter noted with surrounding stranding.     Results for orders placed or performed during the hospital encounter of 08/19/20 (from the past 24 hour(s))   CBC with platelets differential   Result Value Ref Range    WBC 8.2 4.0 - 11.0 10e9/L    RBC Count 5.49 4.4 - 5.9 10e12/L    Hemoglobin 15.8 13.3 - 17.7 g/dL    Hematocrit 46.9 40.0 - 53.0 %    MCV 85 78 - 100 fl    MCH 28.8 26.5 - 33.0 pg    MCHC 33.7 31.5 - 36.5 g/dL    RDW 13.1 10.0 - 15.0 %    Platelet Count 247 150 - 450 10e9/L    Diff Method Automated Method     % Neutrophils 72.1 %    % Lymphocytes 17.2 %    % Monocytes 8.9 %    % Eosinophils 1.2 %    % Basophils 0.2 %    % Immature Granulocytes 0.4 %    Nucleated RBCs 0 0 /100    Absolute Neutrophil 5.9 1.6 - 8.3 10e9/L    Absolute Lymphocytes 1.4 0.8 - 5.3 10e9/L    Absolute Monocytes 0.7 0.0 - 1.3 10e9/L    Absolute Eosinophils 0.1 0.0 - 0.7 10e9/L    Absolute Basophils 0.0 0.0 - 0.2 10e9/L    Abs Immature Granulocytes 0.0 0 - 0.4 10e9/L    Absolute Nucleated RBC 0.0    Comprehensive metabolic panel   Result Value Ref Range    Sodium 139 133 - 144 mmol/L    Potassium 3.8 3.4 - 5.3 mmol/L    Chloride 107 94 - 109 mmol/L    Carbon Dioxide 26 20 - 32 mmol/L    Anion Gap 6 3 - 14 mmol/L    Glucose 104 (H) 70 - 99 mg/dL    Urea Nitrogen 35 (H) 7 - 30 mg/dL    Creatinine 1.32 (H) 0.66 - 1.25 mg/dL    GFR Estimate 59 (L) >60 mL/min/[1.73_m2]    GFR Estimate If Black 68 >60 mL/min/[1.73_m2]    Calcium 8.9 8.5 - 10.1 mg/dL    Bilirubin Total 0.6 0.2 - 1.3 mg/dL    Albumin 4.5 3.4 - 5.0 g/dL    Protein Total 8.1 6.8 - 8.8 g/dL    Alkaline Phosphatase 67 40 - 150 U/L    ALT 36 0 - 70 U/L    AST 22 0 - 45 U/L   Lipase   Result Value Ref Range    Lipase 204 73 - 393 U/L   INR   Result Value Ref Range    INR 1.08 0.86 - 1.14   UA with Microscopic   Result Value Ref Range    Color Urine Light Yellow     Appearance Urine Clear      Glucose Urine Negative NEG^Negative mg/dL    Bilirubin Urine Negative NEG^Negative    Ketones Urine Negative NEG^Negative mg/dL    Specific Gravity Urine 1.020 1.003 - 1.035    Blood Urine Trace (A) NEG^Negative    pH Urine 5.5 4.7 - 8.0 pH    Protein Albumin Urine 10 (A) NEG^Negative mg/dL    Urobilinogen mg/dL Normal 0.0 - 2.0 mg/dL    Nitrite Urine Negative NEG^Negative    Leukocyte Esterase Urine Negative NEG^Negative    Source Midstream Urine     WBC Urine <1 0 - 5 /HPF    RBC Urine 3 (H) 0 - 2 /HPF    Bacteria Urine None (A) NEG^Negative /HPF       Medications   ondansetron (ZOFRAN) injection 4 mg (has no administration in time range)   morphine (PF) injection 4 mg (has no administration in time range)   ketorolac (TORADOL) injection 30 mg (has no administration in time range)       Assessments & Plan (with Medical Decision Making)     59-year-old male who presents today with complaints of left flank pain.  Unremarkable physical exam with CT of the abdomen showing a 2 mm stone with left hydronephrosis and hydroureter.  Labs notable for elevation in BUN/cr and as above.    Treated with IV fluids and pain medications.  At time of discharge patient feeling much better.  Patient is going to be discharged home with referral to urology for follow-up.  Urine strainer to be given.  Patient understands to have his BUN and creatinine checked in 2 days with primary care doctor.  Patient also understands to return if he develops any new or worsening pain symptoms.  Patient to continue Tylenol and Motrin for now but may use Norco as needed. [facility] database checked prior to rx.     Due to the nature of this electronic medical record, laboratory results, imaging results, diagnosis, other information and medications reported above may not represent information available to me at the the time of my care and disposition. Medications reported above may have not been ordered by me.     Portions of the record may have been  created with voice recognition software. Occasional wrong-word or 'sound-a- like' substitution may have occurred due to the inherent limitations of voice recognition software. Though the chart has been reviewed, there may be inadvertent transcription errors. Read the chart carefully and recognize, using context, where substitutions have occurred.       New Prescriptions    No medications on file       Final diagnoses:   Kidney stone       8/19/2020   [facility]     Debi Live MD  08/20/20 0507       Debi Live MD  08/21/20 1950

## 2020-08-20 NOTE — ED NOTES
"Pt reports no change in Lt flank pain and nausea, \"I don't know which is worse, the pain or the nausea. Dr Lvie notified.  "

## 2020-08-24 DIAGNOSIS — I10 BENIGN ESSENTIAL HYPERTENSION: ICD-10-CM

## 2020-08-24 LAB
ALBUMIN SERPL-MCNC: 4.1 G/DL (ref 3.4–5)
ALP SERPL-CCNC: 72 U/L (ref 40–150)
ALT SERPL W P-5'-P-CCNC: 36 U/L (ref 0–70)
ANION GAP SERPL CALCULATED.3IONS-SCNC: 1 MMOL/L (ref 3–14)
AST SERPL W P-5'-P-CCNC: 22 U/L (ref 0–45)
BASOPHILS # BLD AUTO: 0 10E9/L (ref 0–0.2)
BASOPHILS NFR BLD AUTO: 0.2 %
BILIRUB SERPL-MCNC: 0.5 MG/DL (ref 0.2–1.3)
BUN SERPL-MCNC: 15 MG/DL (ref 7–30)
CALCIUM SERPL-MCNC: 9 MG/DL (ref 8.5–10.1)
CHLORIDE SERPL-SCNC: 105 MMOL/L (ref 94–109)
CHOLEST SERPL-MCNC: 221 MG/DL
CO2 SERPL-SCNC: 29 MMOL/L (ref 20–32)
CREAT SERPL-MCNC: 0.98 MG/DL (ref 0.66–1.25)
DIFFERENTIAL METHOD BLD: NORMAL
EOSINOPHIL # BLD AUTO: 0.2 10E9/L (ref 0–0.7)
EOSINOPHIL NFR BLD AUTO: 3.2 %
ERYTHROCYTE [DISTWIDTH] IN BLOOD BY AUTOMATED COUNT: 12.9 % (ref 10–15)
GFR SERPL CREATININE-BSD FRML MDRD: 84 ML/MIN/{1.73_M2}
GLUCOSE SERPL-MCNC: 89 MG/DL (ref 70–99)
HCT VFR BLD AUTO: 46.7 % (ref 40–53)
HDLC SERPL-MCNC: 37 MG/DL
HGB BLD-MCNC: 15.8 G/DL (ref 13.3–17.7)
IMM GRANULOCYTES # BLD: 0 10E9/L (ref 0–0.4)
IMM GRANULOCYTES NFR BLD: 0.5 %
LDLC SERPL CALC-MCNC: ABNORMAL MG/DL
LYMPHOCYTES # BLD AUTO: 1.7 10E9/L (ref 0.8–5.3)
LYMPHOCYTES NFR BLD AUTO: 25.3 %
MCH RBC QN AUTO: 28.8 PG (ref 26.5–33)
MCHC RBC AUTO-ENTMCNC: 33.8 G/DL (ref 31.5–36.5)
MCV RBC AUTO: 85 FL (ref 78–100)
MONOCYTES # BLD AUTO: 0.6 10E9/L (ref 0–1.3)
MONOCYTES NFR BLD AUTO: 9.5 %
NEUTROPHILS # BLD AUTO: 4 10E9/L (ref 1.6–8.3)
NEUTROPHILS NFR BLD AUTO: 61.3 %
NONHDLC SERPL-MCNC: 184 MG/DL
NRBC # BLD AUTO: 0 10*3/UL
NRBC BLD AUTO-RTO: 0 /100
PLATELET # BLD AUTO: 238 10E9/L (ref 150–450)
POTASSIUM SERPL-SCNC: 4.2 MMOL/L (ref 3.4–5.3)
PROT SERPL-MCNC: 7.7 G/DL (ref 6.8–8.8)
RBC # BLD AUTO: 5.48 10E12/L (ref 4.4–5.9)
SODIUM SERPL-SCNC: 135 MMOL/L (ref 133–144)
TRIGL SERPL-MCNC: 453 MG/DL
WBC # BLD AUTO: 6.5 10E9/L (ref 4–11)

## 2020-08-24 PROCEDURE — 80061 LIPID PANEL: CPT | Performed by: FAMILY MEDICINE

## 2020-08-24 PROCEDURE — 36415 COLL VENOUS BLD VENIPUNCTURE: CPT | Performed by: FAMILY MEDICINE

## 2020-08-24 PROCEDURE — 85025 COMPLETE CBC W/AUTO DIFF WBC: CPT | Performed by: FAMILY MEDICINE

## 2020-08-24 PROCEDURE — 80053 COMPREHEN METABOLIC PANEL: CPT | Performed by: FAMILY MEDICINE

## 2020-08-28 ENCOUNTER — OFFICE VISIT (OUTPATIENT)
Dept: FAMILY MEDICINE | Facility: OTHER | Age: 60
End: 2020-08-28
Attending: FAMILY MEDICINE
Payer: COMMERCIAL

## 2020-08-28 VITALS
WEIGHT: 236 LBS | HEIGHT: 69 IN | OXYGEN SATURATION: 98 % | HEART RATE: 63 BPM | SYSTOLIC BLOOD PRESSURE: 152 MMHG | BODY MASS INDEX: 34.96 KG/M2 | DIASTOLIC BLOOD PRESSURE: 78 MMHG | TEMPERATURE: 97.6 F

## 2020-08-28 DIAGNOSIS — E78.2 MIXED HYPERLIPIDEMIA: ICD-10-CM

## 2020-08-28 DIAGNOSIS — I10 BENIGN ESSENTIAL HYPERTENSION: ICD-10-CM

## 2020-08-28 DIAGNOSIS — E78.5 DYSLIPIDEMIA: ICD-10-CM

## 2020-08-28 DIAGNOSIS — Z23 NEED FOR VACCINATION: Primary | ICD-10-CM

## 2020-08-28 DIAGNOSIS — N20.0 NEPHROLITHIASIS: ICD-10-CM

## 2020-08-28 PROCEDURE — 90471 IMMUNIZATION ADMIN: CPT | Performed by: FAMILY MEDICINE

## 2020-08-28 PROCEDURE — 99214 OFFICE O/P EST MOD 30 MIN: CPT | Mod: 25 | Performed by: FAMILY MEDICINE

## 2020-08-28 PROCEDURE — 99000 SPECIMEN HANDLING OFFICE-LAB: CPT | Performed by: FAMILY MEDICINE

## 2020-08-28 PROCEDURE — 82365 CALCULUS SPECTROSCOPY: CPT | Mod: 90 | Performed by: FAMILY MEDICINE

## 2020-08-28 PROCEDURE — 90750 HZV VACC RECOMBINANT IM: CPT | Performed by: FAMILY MEDICINE

## 2020-08-28 RX ORDER — AMLODIPINE BESYLATE 5 MG/1
5 TABLET ORAL DAILY
Qty: 90 TABLET | Refills: 1 | Status: SHIPPED | OUTPATIENT
Start: 2020-08-28 | End: 2021-01-08

## 2020-08-28 RX ORDER — ATORVASTATIN CALCIUM 20 MG/1
TABLET, FILM COATED ORAL
Qty: 90 TABLET | Refills: 0 | Status: SHIPPED | OUTPATIENT
Start: 2020-08-28 | End: 2020-11-27

## 2020-08-28 ASSESSMENT — PATIENT HEALTH QUESTIONNAIRE - PHQ9: SUM OF ALL RESPONSES TO PHQ QUESTIONS 1-9: 0

## 2020-08-28 ASSESSMENT — PAIN SCALES - GENERAL: PAINLEVEL: NO PAIN (0)

## 2020-08-28 ASSESSMENT — MIFFLIN-ST. JEOR: SCORE: 1872.93

## 2020-08-28 NOTE — NURSING NOTE
"Chief Complaint   Patient presents with     Hypertension     Lipids       Initial BP (!) 172/74   Pulse 63   Temp 97.6  F (36.4  C)   Ht 1.74 m (5' 8.5\")   Wt 107 kg (236 lb)   SpO2 98%   BMI 35.36 kg/m   Estimated body mass index is 35.36 kg/m  as calculated from the following:    Height as of this encounter: 1.74 m (5' 8.5\").    Weight as of this encounter: 107 kg (236 lb).  Medication Reconciliation: complete  Barron Ivey LPN  "

## 2020-08-28 NOTE — PROGRESS NOTES
"Subjective     Josef Rod is a 59 year old male who presents to clinic today for the following health issues:    HPI       Hyperlipidemia Follow-Up      Are you regularly taking any medication or supplement to lower your cholesterol?   No, stopped medication after blood work was good    Are you having muscle aches or other side effects that you think could be caused by your cholesterol lowering medication?  No    Hypertension Follow-up      Do you check your blood pressure regularly outside of the clinic? No     Are you following a low salt diet? Yes    Are your blood pressures ever more than 140 on the top number (systolic) OR more   than 90 on the bottom number (diastolic), for example 140/90? Yes      How many servings of fruits and vegetables do you eat daily?  2-3    On average, how many sweetened beverages do you drink each day (Examples: soda, juice, sweet tea, etc.  Do NOT count diet or artificially sweetened beverages)?   0    How many days per week do you exercise enough to make your heart beat faster? 7    How many minutes a day do you exercise enough to make your heart beat faster? 60 or more    How many days per week do you miss taking your medication? 0    Concern - Nephrolithiasis  Brenden was seen in the ER with left flank pain. He was noted to have left renal stone. This has passed and is now brought in for stone analysis.  No fever, flank pain, nausea, vomiting, dysuria, hematuria, pneumaturia, nocturia, frequency, urgency, or incontinence.            Review of Systems   Constitutional, HEENT, cardiovascular, pulmonary, gi and gu systems are negative, except as otherwise noted.      Objective    BP (!) 172/74   Pulse 63   Temp 97.6  F (36.4  C)   Ht 1.74 m (5' 8.5\")   Wt 107 kg (236 lb)   SpO2 98%   BMI 35.36 kg/m    Body mass index is 35.36 kg/m .  Physical Exam  Vitals signs and nursing note reviewed.   Constitutional:       Appearance: He is well-developed.   Neurological:      " "Mental Status: He is alert and oriented to person, place, and time.   Psychiatric:         Mood and Affect: Mood normal.         Behavior: Behavior normal.         Thought Content: Thought content normal.        Other exam not repeated    No results found for any visits on 08/28/20.        Assessment & Plan     Benign essential hypertension  Goals reviewed.  Continue home BP monitoring and same medication regimen.  Follow up 6 months.   - amLODIPine (NORVASC) 5 MG tablet; Take 1 tablet (5 mg) by mouth daily    Dyslipidemia  Fasting labs reviewed.  Goals discussed.  Discussed the importance of diet, exercise, and weight reduction.  Follow up 3 months. Restart atorvastatin as written  - atorvastatin (LIPITOR) 20 MG tablet; TAKE ONE TABLET BY MOUTH EVERY DAY    Nephrolithiasis  No recurrence.  Stone sent for analysis  - Stone analysis; Future  - Stone analysis    Need for vaccination  Updated  - SHINGRIX [53084]  - 1st  Administration  [00695]       BMI:   Estimated body mass index is 35.36 kg/m  as calculated from the following:    Height as of this encounter: 1.74 m (5' 8.5\").    Weight as of this encounter: 107 kg (236 lb).   Weight management plan: Discussed healthy diet and exercise guidelines        See Patient Instructions    No follow-ups on file.    Alban William MD  [facility] [place]    "

## 2020-09-02 LAB
APPEARANCE STONE: NORMAL
COMPN STONE: NORMAL
NUMBER STONE: 1
SIZE STONE: NORMAL MM
WT STONE: 13 MG

## 2020-11-27 DIAGNOSIS — E78.5 DYSLIPIDEMIA: ICD-10-CM

## 2020-11-27 RX ORDER — ATORVASTATIN CALCIUM 20 MG/1
TABLET, FILM COATED ORAL
Qty: 90 TABLET | Refills: 3 | Status: SHIPPED | OUTPATIENT
Start: 2020-11-27 | End: 2022-05-18

## 2020-11-27 NOTE — TELEPHONE ENCOUNTER
Lipitor       Last Written Prescription Date:  8-28-2-  Last Fill Quantity: 90,   # refills: 0  Last Office Visit: 8-28-20  Future Office visit:

## 2020-12-17 ENCOUNTER — ALLIED HEALTH/NURSE VISIT (OUTPATIENT)
Dept: FAMILY MEDICINE | Facility: OTHER | Age: 60
End: 2020-12-17
Attending: FAMILY MEDICINE
Payer: COMMERCIAL

## 2020-12-17 DIAGNOSIS — Z23 NEED FOR VACCINATION: Primary | ICD-10-CM

## 2020-12-17 PROCEDURE — 90471 IMMUNIZATION ADMIN: CPT

## 2020-12-17 PROCEDURE — 90750 HZV VACC RECOMBINANT IM: CPT

## 2021-01-06 DIAGNOSIS — I10 BENIGN ESSENTIAL HYPERTENSION: ICD-10-CM

## 2021-01-07 NOTE — TELEPHONE ENCOUNTER
amlodipine      Last Written Prescription Date:  08/28/2020  Last Fill Quantity: 90,   # refills: 1  Last Office Visit: 08/28/2020  Future Office visit:

## 2021-01-08 RX ORDER — AMLODIPINE BESYLATE 5 MG/1
5 TABLET ORAL DAILY
Qty: 90 TABLET | Refills: 1 | Status: SHIPPED | OUTPATIENT
Start: 2021-01-08 | End: 2021-10-11

## 2021-04-18 ENCOUNTER — HEALTH MAINTENANCE LETTER (OUTPATIENT)
Age: 61
End: 2021-04-18

## 2021-05-11 NOTE — PROGRESS NOTES
"    Assessment & Plan     Chronic bilateral low back pain without sciatica  Discussed the possibility of lumbar disc disease.  MRI scheduled.  Follow up as arranged.  - MR Lumbar Spine w/o Contrast; Future    Benign prostatic hyperplasia with urinary frequency  Suspect BPH.  Begin Flomax once daily.  Follow up 3 months.  - tamsulosin (FLOMAX) 0.4 MG capsule; Take 1 capsule (0.4 mg) by mouth daily  - Prostate spec antigen screen    956}     BMI:   Estimated body mass index is 38.06 kg/m  as calculated from the following:    Height as of this encounter: 1.74 m (5' 8.5\").    Weight as of this encounter: 115.2 kg (254 lb).   Weight management plan: Discussed healthy diet and exercise guidelines    See Patient Instructions    No follow-ups on file.    Alban William MD  [facility] - SILKE Wilcox is a 60 year old who presents for the following health issues     HPI     Back Pain  Onset/Duration: 2 years  Description:   Location of pain: low back bilateral  Character of pain: gnawing, fullness and cramping  Pain radiation: none  New numbness or weakness in legs, not attributed to pain: no   Intensity: Currently 4/10  Progression of Symptoms: worsening  History:   Specific cause: none  Pain interferes with job:  no   History of back problems: no prior back problems  Any previous MRI or X-rays: None  Sees a specialist for back pain: No  Alleviating factors:   Improved by: none    Precipitating factors:  Worsened by: Lifting, Bending and Standing  Therapies tried and outcome: activity and stretch    Accompanying Signs & Symptoms:  Risk of Fracture: None  Risk of Cauda Equina: None  Risk of Infection: None  Risk of Cancer: None  Risk of Ankylosing Spondylitis: Onset at age <35, male, AND morning back stiffness  no     Brenden continues to have chronic low back pain.  Occasional radiation to his buttocks.  No numbness, tingling or paresthesias.      Urinary issues.  Brenden notes urinary urgency, " "hesitancy, as well as nocturia.  His last PSA is reviewed.      Review of Systems   Constitutional, HEENT, cardiovascular, pulmonary, gi and gu systems are negative, except as otherwise noted.      Objective    /72 (BP Location: Left arm, Patient Position: Sitting, Cuff Size: Adult Large)   Pulse 74   Temp 98.2  F (36.8  C) (Tympanic)   Resp 19   Ht 1.74 m (5' 8.5\")   Wt 115.2 kg (254 lb)   SpO2 96%   BMI 38.06 kg/m    Body mass index is 38.06 kg/m .  Physical Exam  Vitals signs and nursing note reviewed.   Constitutional:       Appearance: He is well-developed.   Neurological:      Mental Status: He is alert and oriented to person, place, and time.   Psychiatric:         Mood and Affect: Mood normal.         Behavior: Behavior normal.         Thought Content: Thought content normal.          Office Visit on 08/28/2020   Component Date Value Ref Range Status     Stone Composition 08/28/2020 SEE NOTE   Final    Comment: (Note)  Calculi composed primarily of  calcium oxalate monohydrate.  INTERPRETIVE INFORMATION: Calculi (Stone) analysis  Calculi are the products of physiological processes that   yield crystalline compounds in a matrix of biological   compounds and blood.  Matrix components are not reported.    The clinically significant crystalline components   identified in calculi specimens are reported.  Gross   description may not be consistent with composition   determined by FTIR analysis.  Performed By: [facility]  [address] [place] [id number]  : Phoebe Merino MD       Calculi Number 08/28/2020 1   Final     Calculi Size 08/28/2020 1 to 4  mm Final     Calculi Description 08/28/2020 SEE NOTE   Final    Comment: (Note)  Specimen consists of a single, small,  brown, irregular calculus.       Stone Mass 08/28/2020 13  mg Final             "

## 2021-05-14 ENCOUNTER — OFFICE VISIT (OUTPATIENT)
Dept: FAMILY MEDICINE | Facility: OTHER | Age: 61
End: 2021-05-14
Attending: FAMILY MEDICINE
Payer: COMMERCIAL

## 2021-05-14 VITALS
OXYGEN SATURATION: 96 % | HEART RATE: 74 BPM | BODY MASS INDEX: 37.62 KG/M2 | HEIGHT: 69 IN | WEIGHT: 254 LBS | DIASTOLIC BLOOD PRESSURE: 72 MMHG | RESPIRATION RATE: 19 BRPM | SYSTOLIC BLOOD PRESSURE: 130 MMHG | TEMPERATURE: 98.2 F

## 2021-05-14 DIAGNOSIS — G89.29 CHRONIC BILATERAL LOW BACK PAIN WITHOUT SCIATICA: ICD-10-CM

## 2021-05-14 DIAGNOSIS — R35.0 BENIGN PROSTATIC HYPERPLASIA WITH URINARY FREQUENCY: Primary | ICD-10-CM

## 2021-05-14 DIAGNOSIS — N40.1 BENIGN PROSTATIC HYPERPLASIA WITH URINARY FREQUENCY: Primary | ICD-10-CM

## 2021-05-14 DIAGNOSIS — M54.50 CHRONIC BILATERAL LOW BACK PAIN WITHOUT SCIATICA: ICD-10-CM

## 2021-05-14 LAB — PSA SERPL-ACNC: 2.24 UG/L (ref 0–4)

## 2021-05-14 PROCEDURE — 36415 COLL VENOUS BLD VENIPUNCTURE: CPT | Performed by: FAMILY MEDICINE

## 2021-05-14 PROCEDURE — G0103 PSA SCREENING: HCPCS | Performed by: FAMILY MEDICINE

## 2021-05-14 PROCEDURE — 99214 OFFICE O/P EST MOD 30 MIN: CPT | Performed by: FAMILY MEDICINE

## 2021-05-14 RX ORDER — TAMSULOSIN HYDROCHLORIDE 0.4 MG/1
0.4 CAPSULE ORAL DAILY
Qty: 90 CAPSULE | Refills: 1 | Status: SHIPPED | OUTPATIENT
Start: 2021-05-14 | End: 2021-08-06

## 2021-05-14 ASSESSMENT — MIFFLIN-ST. JEOR: SCORE: 1949.58

## 2021-05-14 ASSESSMENT — PAIN SCALES - GENERAL: PAINLEVEL: MODERATE PAIN (4)

## 2021-05-14 NOTE — NURSING NOTE
"Chief Complaint   Patient presents with     Back Pain       Initial /72 (BP Location: Left arm, Patient Position: Sitting, Cuff Size: Adult Large)   Pulse 74   Temp 98.2  F (36.8  C) (Tympanic)   Resp 19   Ht 1.74 m (5' 8.5\")   Wt 115.2 kg (254 lb)   SpO2 96%   BMI 38.06 kg/m   Estimated body mass index is 38.06 kg/m  as calculated from the following:    Height as of this encounter: 1.74 m (5' 8.5\").    Weight as of this encounter: 115.2 kg (254 lb).  Medication Reconciliation: complete  Chioma Gillespie LPN  "

## 2021-06-01 ENCOUNTER — HOSPITAL ENCOUNTER (OUTPATIENT)
Dept: MRI IMAGING | Facility: HOSPITAL | Age: 61
Discharge: HOME | End: 2021-06-01
Attending: FAMILY MEDICINE | Admitting: FAMILY MEDICINE
Payer: COMMERCIAL

## 2021-06-01 DIAGNOSIS — M54.50 CHRONIC BILATERAL LOW BACK PAIN WITHOUT SCIATICA: ICD-10-CM

## 2021-06-01 DIAGNOSIS — G89.29 CHRONIC BILATERAL LOW BACK PAIN WITHOUT SCIATICA: ICD-10-CM

## 2021-06-01 PROCEDURE — 72148 MRI LUMBAR SPINE W/O DYE: CPT

## 2021-06-15 DIAGNOSIS — M54.50 CHRONIC BILATERAL LOW BACK PAIN WITHOUT SCIATICA: Primary | ICD-10-CM

## 2021-06-15 DIAGNOSIS — G89.29 CHRONIC BILATERAL LOW BACK PAIN WITHOUT SCIATICA: Primary | ICD-10-CM

## 2021-06-23 ENCOUNTER — HOSPITAL ENCOUNTER (OUTPATIENT)
Dept: INTERVENTIONAL RADIOLOGY/VASCULAR | Facility: HOSPITAL | Age: 61
Discharge: HOME | End: 2021-06-23
Attending: FAMILY MEDICINE | Admitting: RADIOLOGY
Payer: COMMERCIAL

## 2021-06-23 DIAGNOSIS — G89.29 CHRONIC BILATERAL LOW BACK PAIN WITHOUT SCIATICA: ICD-10-CM

## 2021-06-23 DIAGNOSIS — M54.50 CHRONIC BILATERAL LOW BACK PAIN WITHOUT SCIATICA: ICD-10-CM

## 2021-06-23 PROCEDURE — G0463 HOSPITAL OUTPT CLINIC VISIT: HCPCS

## 2021-07-21 RX ORDER — IOPAMIDOL 612 MG/ML
15 INJECTION, SOLUTION INTRATHECAL ONCE
Status: CANCELLED | OUTPATIENT
Start: 2021-07-21

## 2021-07-21 RX ORDER — DEXAMETHASONE SODIUM PHOSPHATE 10 MG/ML
10 INJECTION, SOLUTION INTRAMUSCULAR; INTRAVENOUS ONCE
Status: CANCELLED | OUTPATIENT
Start: 2021-07-21

## 2021-07-21 RX ORDER — LIDOCAINE HYDROCHLORIDE 10 MG/ML
5 INJECTION, SOLUTION EPIDURAL; INFILTRATION; INTRACAUDAL; PERINEURAL ONCE
Status: CANCELLED | OUTPATIENT
Start: 2021-07-21

## 2021-07-22 ENCOUNTER — HOSPITAL ENCOUNTER (OUTPATIENT)
Facility: HOSPITAL | Age: 61
Discharge: HOME | End: 2021-07-22
Attending: RADIOLOGY | Admitting: RADIOLOGY
Payer: COMMERCIAL

## 2021-07-22 ENCOUNTER — HOSPITAL ENCOUNTER (OUTPATIENT)
Dept: INTERVENTIONAL RADIOLOGY/VASCULAR | Facility: HOSPITAL | Age: 61
End: 2021-07-22
Attending: FAMILY MEDICINE | Admitting: RADIOLOGY
Payer: COMMERCIAL

## 2021-07-22 DIAGNOSIS — M47.816 LUMBAR SPONDYLOSIS: ICD-10-CM

## 2021-07-22 PROCEDURE — 64483 NJX AA&/STRD TFRM EPI L/S 1: CPT

## 2021-07-22 PROCEDURE — 250N000011 HC RX IP 250 OP 636: Performed by: RADIOLOGY

## 2021-07-22 PROCEDURE — C1751 CATH, INF, PER/CENT/MIDLINE: HCPCS

## 2021-07-22 RX ORDER — LIDOCAINE HYDROCHLORIDE 10 MG/ML
5 INJECTION, SOLUTION EPIDURAL; INFILTRATION; INTRACAUDAL; PERINEURAL ONCE
Status: DISCONTINUED | OUTPATIENT
Start: 2021-07-22 | End: 2021-07-22 | Stop reason: CLARIF

## 2021-07-22 RX ORDER — DEXAMETHASONE SODIUM PHOSPHATE 10 MG/ML
10 INJECTION, SOLUTION INTRAMUSCULAR; INTRAVENOUS ONCE
Status: COMPLETED | OUTPATIENT
Start: 2021-07-22 | End: 2021-07-22

## 2021-07-22 RX ORDER — IOPAMIDOL 612 MG/ML
15 INJECTION, SOLUTION INTRATHECAL ONCE
Status: COMPLETED | OUTPATIENT
Start: 2021-07-22 | End: 2021-07-22

## 2021-07-22 RX ADMIN — DEXAMETHASONE SODIUM PHOSPHATE 10 MG: 10 INJECTION, SOLUTION INTRAMUSCULAR; INTRAVENOUS at 11:18

## 2021-07-22 RX ADMIN — IOPAMIDOL 2 ML: 612 INJECTION, SOLUTION INTRATHECAL at 11:18

## 2021-07-22 NOTE — PROGRESS NOTES
Requested to come to IR room for patient who had a translaminar injection.  Patient feeling weak and light headed, diaphoretic.  Patient on cart.  Alert, oriented.  Once in IR room 4, patient reports that he is always sweaty.  Blood pressure 143/87, HR 74.  Patient conversive and oriented.  Denies need for anything to eat or drink.  Patient visited for a bit and repeat vital signs checked.  Patient blood pressure 143/90.  Patient denies any nausea or lightheadedness at this time.  Patient stood with transfer belt and assist x2.  Wife called and patient ambulated to main entrance under own power.

## 2021-07-22 NOTE — DISCHARGE INSTRUCTIONS
Cell number on file:    Telephone Information:   Mobile [phone]     Is it ok to leave a message at this number(s)? Yes    Dr. Baez completed your procedure on 7/22/2021.    Current Pain Level (0-10 Scale): 0/10 SITTING     7/10 WHEN WALKING  Post Pain Level (0-10):  0/10    Radiology Discharge instructions for Steroid Injection    Activity Level:     Do not do any heavy activity or exercise for 24 hours.   Do not drive for 4 hours after your injection.  Diet:   Return to your normal diet.  Medications:   If you have stopped taking your Aspirin, Coumadin/Warfarin, Ibuprofen, or any   other blood thinner for this procedure you may resume in the morning unless   your primary care provider has given you other instructions.    Diabetics may see an increase in blood sugar after steroid injections. If you are concerned about your blood sugar, please contact your family doctor.    Site Care:  Remove the bandage and bathe or shower the morning after the procedure.      This is a Pain Management procedure.  You will be contacted in two weeks for follow up.    Call your Primary Care Provider if you have the following (if your primary care provider is not available please seek emergency care):   Nausea with vomiting   Severe headache   Drowsiness or confusion   Redness or drainage at the injection or puncture site   Temperature over 101 degrees F   Other concerns   Worsening back pain   Stiff neck

## 2021-08-04 ENCOUNTER — HOSPITAL ENCOUNTER (EMERGENCY)
Facility: HOSPITAL | Age: 61
Discharge: HOME | End: 2021-08-04
Attending: STUDENT IN AN ORGANIZED HEALTH CARE EDUCATION/TRAINING PROGRAM | Admitting: STUDENT IN AN ORGANIZED HEALTH CARE EDUCATION/TRAINING PROGRAM
Payer: COMMERCIAL

## 2021-08-04 VITALS
SYSTOLIC BLOOD PRESSURE: 164 MMHG | DIASTOLIC BLOOD PRESSURE: 90 MMHG | HEART RATE: 68 BPM | RESPIRATION RATE: 16 BRPM | OXYGEN SATURATION: 94 % | TEMPERATURE: 98.7 F

## 2021-08-04 DIAGNOSIS — T63.441A BEE STING REACTION, ACCIDENTAL OR UNINTENTIONAL, INITIAL ENCOUNTER: ICD-10-CM

## 2021-08-04 DIAGNOSIS — T78.40XA ALLERGIC REACTION, INITIAL ENCOUNTER: ICD-10-CM

## 2021-08-04 PROCEDURE — 96375 TX/PRO/DX INJ NEW DRUG ADDON: CPT

## 2021-08-04 PROCEDURE — 99284 EMERGENCY DEPT VISIT MOD MDM: CPT | Mod: 25

## 2021-08-04 PROCEDURE — 96374 THER/PROPH/DIAG INJ IV PUSH: CPT

## 2021-08-04 PROCEDURE — 250N000011 HC RX IP 250 OP 636: Performed by: STUDENT IN AN ORGANIZED HEALTH CARE EDUCATION/TRAINING PROGRAM

## 2021-08-04 PROCEDURE — 250N000009 HC RX 250: Performed by: STUDENT IN AN ORGANIZED HEALTH CARE EDUCATION/TRAINING PROGRAM

## 2021-08-04 PROCEDURE — 99284 EMERGENCY DEPT VISIT MOD MDM: CPT | Performed by: STUDENT IN AN ORGANIZED HEALTH CARE EDUCATION/TRAINING PROGRAM

## 2021-08-04 RX ORDER — METHYLPREDNISOLONE SODIUM SUCCINATE 125 MG/2ML
125 INJECTION, POWDER, LYOPHILIZED, FOR SOLUTION INTRAMUSCULAR; INTRAVENOUS ONCE
Status: COMPLETED | OUTPATIENT
Start: 2021-08-04 | End: 2021-08-04

## 2021-08-04 RX ORDER — FAMOTIDINE 20 MG/1
20 TABLET, FILM COATED ORAL 2 TIMES DAILY
Qty: 8 TABLET | Refills: 0 | Status: SHIPPED | OUTPATIENT
Start: 2021-08-04 | End: 2021-08-08

## 2021-08-04 RX ORDER — PREDNISONE 20 MG/1
40 TABLET ORAL DAILY
Qty: 8 TABLET | Refills: 0 | Status: SHIPPED | OUTPATIENT
Start: 2021-08-04 | End: 2021-08-08

## 2021-08-04 RX ORDER — DIPHENHYDRAMINE HYDROCHLORIDE 50 MG/ML
25 INJECTION INTRAMUSCULAR; INTRAVENOUS ONCE
Status: COMPLETED | OUTPATIENT
Start: 2021-08-04 | End: 2021-08-04

## 2021-08-04 RX ADMIN — METHYLPREDNISOLONE SODIUM SUCCINATE 125 MG: 125 INJECTION, POWDER, FOR SOLUTION INTRAMUSCULAR; INTRAVENOUS at 15:07

## 2021-08-04 RX ADMIN — FAMOTIDINE 40 MG: 10 INJECTION INTRAVENOUS at 15:08

## 2021-08-04 RX ADMIN — DIPHENHYDRAMINE HYDROCHLORIDE 25 MG: 50 INJECTION, SOLUTION INTRAMUSCULAR; INTRAVENOUS at 15:08
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
## 2021-08-04 NOTE — ED NOTES
Discharge instructions reviewed with patient. 2 Rx's have been e-scribed to pharmacy of choice.  Pt reports he feels better and is ready to go home. Pt called for ride.  Encouraged to return with new or worsening symptoms. No questions or concerns.  Pt ambulated out ED indep gait steady. AVS in hand

## 2021-08-04 NOTE — DISCHARGE INSTRUCTIONS
Please buy Benadryl over-the-counter and continue to take that for the next 4 days at 25 mg every 4 to 6 hours or 50 mg every 6 to 8 hours and continue as needed.  I have provided prescriptions for famotidine and prednisone for the next 4 days as well.  Take this as directed.  Please follow-up with your primary care provider within a week if you have any lingering symptoms.  Certainly return to the emergency department if you have worsening of symptoms especially difficulty breathing or any new concerning development.

## 2021-08-04 NOTE — ED PROVIDER NOTES
History     Chief Complaint   Patient presents with     Insect Bite     HPI  Josef Rod is a 60 year old male with history of dyslipidemia, hypertension, gout and kidney stones who presents to the emergency department today complaining of hymenoptera sting.  He states that he was riding his bike, was stung by a wasp.  He notes the wasp was caught in his helmet for maybe half an hour during the ride so is not sure how many times he got stung but he did feel only one sting on his right forehead.  He developed some swelling this morning immediately afterwards which was around 630, he took Benadryl around 730, 5 0 mg, thought he saw some improvement, took 50 mg of Benadryl again at 1 PM, but subsequently noticed the swelling started in his right arm and armpit/chest, that it continued down his forehead and his so he came to the emergency department for further evaluation.  No other complaints, no trauma, no nausea, no vomiting, no cough, no sore throat, no difficulty breathing, no double vision    Allergies:  Allergies   Allergen Reactions     Penicillins        Problem List:    Patient Active Problem List    Diagnosis Date Noted     Nephrolithiasis 08/28/2020     Priority: Medium     Primary osteoarthritis of both knees 11/20/2017     Priority: Medium     Morbid obesity (H) 10/01/2017     Priority: Medium     HTN (hypertension) 07/05/2016     Priority: Medium     ACP (advance care planning) 06/28/2016     Priority: Medium     Advance Care Planning 6/28/2016: ACP Review of Chart / Resources Provided:  Reviewed chart for advance care plan.  Josef Rod has no plan or code status on file. Discussed available resources and provided with information. Confirmed code status reflects current choices pending further ACP discussions.  Confirmed/documented legally designated decision makers.  Added by Reema Kumar             Gout 12/23/2014     Priority: Medium     Dyslipidemia 10/29/2013     Priority:  Medium        Past Medical History:    Past Medical History:   Diagnosis Date     Dyslipidemia 2/6/2001     Hypertension      Obesity 8/21/2012       Past Surgical History:    Past Surgical History:   Procedure Laterality Date     arthroscopy  2005    RT     ARTHROSCOPY KNEE  2005    LT     IR CONSULTATION FOR IR EXAM  6/23/2021     TONSILLECTOMY  19??       Family History:    Family History   Problem Relation Age of Onset     Arthritis Mother      Heart Disease Father        Social History:  Marital Status:   [2]  Social History     Tobacco Use     Smoking status: Never Smoker     Smokeless tobacco: Never Used     Tobacco comment: no passive exposure   Substance Use Topics     Alcohol use: Yes     Comment: beer and liquor monthly     Drug use: No        Medications:    amLODIPine (NORVASC) 5 MG tablet  aspirin 325 MG tablet  atorvastatin (LIPITOR) 20 MG tablet  Cholecalciferol (VITAMIN D) 2000 UNITS tablet  famotidine (PEPCID) 20 MG tablet  predniSONE (DELTASONE) 20 MG tablet  order for DME  order for DME  order for DME  tamsulosin (FLOMAX) 0.4 MG capsule          Review of Systems  A complete review of systems was performed and is otherwise negative.     Physical Exam   BP: (!) 195/92  Pulse: 85  Temp: 98.5  F (36.9  C)  Resp: 20  SpO2: 94 %      Physical Exam  Constitutional: Alert and conversant. NAD   HENT: NCAT oropharynx is clear, no evidence of swelling of the tongue, the lips or any other component of the oropharynx  Eyes: Normal pupils   Neck: supple   CV: Normal rate, regular rhythm, no murmur   Pulmonary/Chest: Non-labored respirations, clear to auscultation bilaterally   Abdominal: Soft, non-tender, non-distended   MSK: FOFANA.   Neuro: Alert and appropriate   Skin: Warm and dry. No diaphoresis.  There is redness and swelling on the patient's forehead projecting down to the level of the bridge of his nose, it does not involve his mouth  Psych: Appropriate mood and affect     ED Course     ED Course  as of Aug 04 1641   Wed Aug 04, 2021   1519 Differential includes but is not limited to anaphylaxis, allergic reaction, typical bee sting response, mastocytosis. This patient does not meet anaphylaxis criteria and appears to have a mild reaction to a hymenoptera sting. Exam did not reveal a stinger and therefore a stinger was not removed. Given IV benadryl, famotidine and steroids and observed in the emergency department      1635 Symptoms continue to improve after period of almost 2 hours of observation.  The swelling and redness under his arm is virtually gone, swelling on his forehead is improving.  There still remains some swelling around his eyelids.  His breathing remains normal, he is feeling better.  He is appropriate for further outpatient management, will discharge with Benadryl, famotidine, steroids.  Recommending primary care follow-up within a week.  Return precautions given discharged in stable condition with all questions answered        Procedures         No results found for this or any previous visit (from the past 24 hour(s)).    Medications   famotidine (PEPCID) injection 40 mg (40 mg Intravenous Given 8/4/21 1508)   methylPREDNISolone sodium succinate (solu-MEDROL) injection 125 mg (125 mg Intravenous Given 8/4/21 1507)   diphenhydrAMINE (BENADRYL) injection 25 mg (25 mg Intravenous Given 8/4/21 1508)       Assessments & Plan (with Medical Decision Making)   See ED course  I have reviewed the nursing notes.    I have reviewed the findings, diagnosis, plan and need for follow up with the patient.    New Prescriptions    FAMOTIDINE (PEPCID) 20 MG TABLET    Take 1 tablet (20 mg) by mouth 2 times daily for 4 days    PREDNISONE (DELTASONE) 20 MG TABLET    Take 2 tablets (40 mg) by mouth daily for 4 days       Final diagnoses:   Allergic reaction, initial encounter   Bee sting reaction, accidental or unintentional, initial encounter       8/4/2021   [facility]     Robin Gutierrez,  MD  08/04/21 9224

## 2021-08-04 NOTE — ED TRIAGE NOTES
Pt was stung by wasp this morning around 0630. Stung in face. Started to swell under right arm and now face in the past hour or 2.  told pt to come in. No SOB.  No scratchy throat no distress noted  Took Benadryl at 0715 & 1230

## 2021-08-05 ENCOUNTER — TELEPHONE (OUTPATIENT)
Dept: FAMILY MEDICINE | Facility: OTHER | Age: 61
End: 2021-08-05

## 2021-08-05 NOTE — TELEPHONE ENCOUNTER
Reason for call:  OVERBOOK    Patient is having the following symptoms ER F/U 08/04/21 for ALLERGIC REACTION TO BEE STING    The patient is requesting an appointment for 5-7 DAYS with DR MAC    Was an appointment offered for this call?  NO     Preferred method for responding to this message: TELEPHONE    If we cannot reach you directly, may we leave a detailed response at the number you provided? YES    Can this message wait until your PCP/provider returns, if not available today? NO

## 2021-08-06 ENCOUNTER — TELEPHONE (OUTPATIENT)
Dept: INTERVENTIONAL RADIOLOGY/VASCULAR | Facility: HOSPITAL | Age: 61
End: 2021-08-06

## 2021-08-06 ENCOUNTER — OFFICE VISIT (OUTPATIENT)
Dept: FAMILY MEDICINE | Facility: OTHER | Age: 61
End: 2021-08-06
Attending: FAMILY MEDICINE
Payer: COMMERCIAL

## 2021-08-06 VITALS
BODY MASS INDEX: 37.61 KG/M2 | DIASTOLIC BLOOD PRESSURE: 72 MMHG | OXYGEN SATURATION: 96 % | WEIGHT: 251 LBS | TEMPERATURE: 96.3 F | HEART RATE: 70 BPM | RESPIRATION RATE: 20 BRPM | SYSTOLIC BLOOD PRESSURE: 127 MMHG

## 2021-08-06 DIAGNOSIS — Z91.038 HYMENOPTERA ALLERGY: Primary | ICD-10-CM

## 2021-08-06 PROCEDURE — 99213 OFFICE O/P EST LOW 20 MIN: CPT | Performed by: FAMILY MEDICINE

## 2021-08-06 RX ORDER — METHYLPREDNISOLONE 4 MG
TABLET, DOSE PACK ORAL
Qty: 21 TABLET | Refills: 0 | Status: SHIPPED | OUTPATIENT
Start: 2021-08-06 | End: 2021-10-27

## 2021-08-06 RX ORDER — EPINEPHRINE 0.3 MG/.3ML
0.3 INJECTION SUBCUTANEOUS ONCE
Qty: 0.3 ML | Refills: 3 | Status: SHIPPED | OUTPATIENT
Start: 2021-08-06 | End: 2021-08-06

## 2021-08-06 RX ORDER — CETIRIZINE HYDROCHLORIDE 10 MG/1
10 TABLET ORAL DAILY
Qty: 30 TABLET | Refills: 0 | Status: SHIPPED | OUTPATIENT
Start: 2021-08-06 | End: 2021-10-27

## 2021-08-06 ASSESSMENT — PAIN SCALES - GENERAL: PAINLEVEL: NO PAIN (0)

## 2021-08-06 NOTE — PROGRESS NOTES
Assessment & Plan     Hymenoptera allergy  Discussed avoidance.  Medrol and epinephrine prescribed. Routine follow up  - EPINEPHrine (ANY BX GENERIC EQUIV) 0.3 MG/0.3ML injection 2-pack; Inject 0.3 mLs (0.3 mg) into the muscle once for 1 dose  - methylPREDNISolone (MEDROL DOSEPAK) 4 MG tablet therapy pack; Follow Package Directions  - cetirizine (ZYRTEC) 10 MG tablet; Take 1 tablet (10 mg) by mouth daily       See Patient Instructions    No follow-ups on file.    Alban William MD  [facility] - SILKE Wilcox is a 60 year old who presents for the following health issues     HPI     ED/UC Followup:    Facility:  [facility]  Date of visit: 08/04/2021  Reason for visit: allergic reaction to sting  Current Status: still swollen but better       Brenden is seen in follow up on wasp bite. He was bitten in the forehead and this was followed by swelling as well lymphadenopathy.  He was seen in the Emergency Room and placed on steroids as well as antihistamines.    Review of Systems   Constitutional, HEENT, cardiovascular, pulmonary, gi and gu systems are negative, except as otherwise noted.      Objective    /72 (BP Location: Left arm, Patient Position: Sitting, Cuff Size: Adult Large)   Pulse 70   Temp (!) 96.3  F (35.7  C) (Tympanic)   Resp 20   Wt 113.9 kg (251 lb)   SpO2 96%   BMI 37.61 kg/m    Body mass index is 37.61 kg/m .  Physical Exam  Vitals and nursing note reviewed.   Constitutional:       Appearance: He is well-developed.   HENT:      Head:      Comments: There is persistent edema noted in the periorbital and forehead region  Neurological:      Mental Status: He is alert and oriented to person, place, and time.   Psychiatric:         Mood and Affect: Mood normal.         Behavior: Behavior normal.         Thought Content: Thought content normal.          No results found for any visits on 08/06/21.

## 2021-08-06 NOTE — TELEPHONE ENCOUNTER
CONSULT PATIENT  PAIN INJECTION POST CALL    Procedure: Epidural TF left L2-3  Radiologist(s): Dr. Horace Baez  Date of Procedure: 7/22/21    The patient was not available by telephone. Left message for patient to call [phone].      Madalyn Greco

## 2021-08-13 ENCOUNTER — TELEPHONE (OUTPATIENT)
Dept: INTERVENTIONAL RADIOLOGY/VASCULAR | Facility: HOSPITAL | Age: 61
End: 2021-08-13

## 2021-08-13 NOTE — TELEPHONE ENCOUNTER
CONSULT PATIENT  PAIN INJECTION POST CALL    Procedure:    Epidural TF left L2-3  Radiologist(s):        Dr. Horace Baez  Date of Procedure: 7/22/21    The patient was not available by telephone. Left message for patient to [facility] [phone], second attempt. Will mail post card.    Madalyn Greco

## 2021-08-27 NOTE — PATIENT INSTRUCTIONS
Patient Education     Insect Sting Allergy, Generalized  You are having an allergic reaction to an insect sting. This may occur after a sting by a wasp, honeybee, yellow jacket, fire ant, or other insect. This may cause an itchy rash and swelling in the face or other parts of the body. A more severe reaction may cause you to feel dizzy, faint, or have trouble breathing or swallowing. Other warning signs are listed below.   Symptoms can include:    Rash, hives, redness, welts, or blisters in places other than the sting site    Itching, burning, stinging, pain in places other than the sting site    Dry, flaky, cracking, scaly skin    Swelling in places other than the sting site     Stomach pain or cramps  More severe symptoms are:    Swelling of the face or lips or drooling    Trouble swallowing, feeling like your throat is closing    Trouble breathing, wheezing    Dizziness or a sudden drop in blood pressure    Hoarse voice or trouble speaking    Severe nausea, vomiting, or diarrhea    Feeling faint or lightheaded    Rapid heart rate  Home care  Medicine  The healthcare provider may prescribe medicines to ease swelling, itching, and pain. Follow the provider s instructions when taking these medicines.     If you had a severe reaction, the provider may prescribe an injectable epinephrine kit. Epinephrine will stop the progression of an allergic reaction. Before you leave the hospital, be sure that you know when and how to use this medicine.    Oral diphenhydramine is an over-the-counter antihistamine available at pharmacies and grocery stores. Unless a prescription antihistamine was given, diphenhydramine may be used to reduce itching if large areas of the skin are involved. It may make you sleepy. So be careful using it in the daytime or when going to school, working, or driving. Note: Don t use diphenhydramine if you have glaucoma or if you are a man with trouble urinating due to an enlarged prostate. There are  other antihistamines that cause less drowsiness and are good choices for daytime use. Ask your healthcare provider or pharmacist for suggestions.    Don t use diphenhydramine cream on your skin. It can cause a further reaction in some people.    Calamine lotion or oatmeal baths sometimes help with itching.    You may use acetaminophen or ibuprofen to control pain, unless another pain medicine was prescribed. Note: If you have chronic liver or kidney disease or ever had a stomach ulcer or gastrointestinal bleeding, talk with your provider before using these medicines.  General care    Don't wear tight clothing. And stay away from things that heat up your skin (such as hot showers or baths, or direct sunlight). Heat makes the itching worse.   An ice pack will relieve local areas of intense itching and redness. Apply 5 to 10 minutes. To make an ice pack, put ice cubes in a plastic bag that seals at the top. Wrap the bag in a clean, thin towel or cloth. Don t put ice directly on the skin.   Stings  Wasps, yellow jackets, and hornets don t leave a stinger behind. But if a honeybee stings you, a stinger may stay in your skin. The stinger of a honeybee releases a substance that will attract other bees to you. So try to move away from the nest right away. Once you are away from the nest, then take out the stinger as quickly as possible by:     Scraping the stinger out with the edge of a dull knife or plastic card (credit card).    Don't use tweezers or your fingers to remove the stinger. That may squeeze more toxin from the stinger.    Wash the affected area with soap and warm water 2 to 3 times a day. Don't break a blister, if there is one.    Next apply an ice pack for 5 to 10 minutes. To make an ice pack, put ice cubes in a plastic bag that seals at the top. Wrap the bag in a clean, thin towel or cloth. Don t put ice directly on the skin.    Contact your healthcare provider and ask what can be used to help decrease the  swelling and itching to the affected area.     To prevent an infection, don't scratch the affected areas. Always check the sting site for signs of an infection. These include increased redness, swelling, drainage, or pain.  Preventing future reactions  Future reactions could be worse than this one. So try to stay away from situations where you might be stung:     Don't walk in grass wearing sandals or without shoes.     Don't leave food uncovered when eating outside. Sweet treats, watermelon, and ice cream attract insects.    Don't drink from uncovered sweetened drinks in cans when outside. Insects are attracted to soda drink cans. They can sometimes crawl inside of them.    Don't wear bright colored clothes with flowery prints and patterns when outside.    Don t wear perfume when outside. Smell attracts insects.    Wear long pants, long-sleeved shirts, socks, and work gloves when working outside.    Be aware that honeybees nest in trees. Wasps and yellow jackets nest in the ground, trees, or roof eaves. Stay away from garbage cans when outside.  Auto-injectable epinephrine    If you are at high risk for another sting due to where you work or play, or if you had dizziness, fainting, or trouble breathing or swallowing from the sting, an auto-injectable epinephrine may be prescribed. If not, ask your healthcare provider for one. Always carry it with you. Learn how to use the device. If you start to feel the symptoms of another reaction in the future, use the auto-injectable epinephrine to inject yourself. Then call 911.  Don't wait until symptoms become severe.     Remember that the auto-injectable epinephrine is a rescue medicine only. You still need someone to take you to the hospital or call 911 after you have received the medicine.    Follow-up care  Follow up with your healthcare provider, or as advised if your symptoms do not keep improving.   Call 911  Call 911 if any of these occur:     Trouble breathing or  swallowing, wheezing     Cool, moist, pale skin    Hoarse voice or trouble speaking    Confusion    Very drowsy or trouble waking up    Fainting or loss of consciousness    Rapid heart rate    Low blood pressure or feeling dizzy or weak    Feeling of doom    Severe nausea, vomiting, or diarrhea    Seizure    Swelling in the face, eyelids, lips, mouth, throat, or tongue    Drooling  When to seek medical advice  Call your healthcare provider right away or seek medical care right away if any of the following occur:     Spreading areas of itching, redness, or swelling    Headache, fever, chills, muscle or joint aching    Increased pain or swelling    Signs of infection of the affected area:  ? Spreading redness  ? Increase in pain or swelling  ? Fluid or colored drainage from the site  [employer] last reviewed this educational content on 6/1/2019 2000-2021 [employer]. All rights reserved. This information is not intended as a substitute for professional medical care. Always follow your healthcare professional's instructions.

## 2021-09-27 ENCOUNTER — TRANSFERRED RECORDS (OUTPATIENT)
Dept: HEALTH INFORMATION MANAGEMENT | Facility: CLINIC | Age: 61
End: 2021-09-27

## 2021-10-03 ENCOUNTER — HEALTH MAINTENANCE LETTER (OUTPATIENT)
Age: 61
End: 2021-10-03

## 2021-10-09 DIAGNOSIS — I10 BENIGN ESSENTIAL HYPERTENSION: ICD-10-CM

## 2021-10-11 RX ORDER — AMLODIPINE BESYLATE 5 MG/1
5 TABLET ORAL DAILY
Qty: 90 TABLET | Refills: 1 | Status: SHIPPED | OUTPATIENT
Start: 2021-10-11 | End: 2022-04-10

## 2021-10-11 NOTE — TELEPHONE ENCOUNTER
Norvasc      Last Written Prescription Date:  7.8.21  Last Fill Quantity: #90,   # refills: 0  Last Office Visit: 8.6.21  Future Office visit:       Routing refill request to provider for review/approval because:  Drug not on the [facility], [facility] or [facility] refill protocol or controlled substance

## 2021-10-19 ENCOUNTER — TELEPHONE (OUTPATIENT)
Dept: FAMILY MEDICINE | Facility: OTHER | Age: 61
End: 2021-10-19

## 2021-10-19 NOTE — TELEPHONE ENCOUNTER
2:55 PM    Reason for Call: OVERBOOK    Patient is needing a pre-op for surgery on 11/5/21. Please call patient to schedule appt.     The patient is requesting an appointment for any day after 10/25/2021 with Dr William.    Was an appointment offered for this call? No  If yes : Appointment type              Date    Preferred method for responding to this message: Telephone Call  What is your phone number ? [phone]    If we cannot reach you directly, may we leave a detailed response at the number you provided? Yes    Can this message wait until your PCP/provider returns, if unavailable today? YES, advised call would be 10/20/2021    Gracia Decker

## 2021-10-27 ENCOUNTER — OFFICE VISIT (OUTPATIENT)
Dept: FAMILY MEDICINE | Facility: OTHER | Age: 61
End: 2021-10-27
Attending: FAMILY MEDICINE
Payer: COMMERCIAL

## 2021-10-27 VITALS
HEIGHT: 69 IN | SYSTOLIC BLOOD PRESSURE: 132 MMHG | BODY MASS INDEX: 37.03 KG/M2 | TEMPERATURE: 97.6 F | WEIGHT: 250 LBS | HEART RATE: 66 BPM | OXYGEN SATURATION: 96 % | DIASTOLIC BLOOD PRESSURE: 74 MMHG

## 2021-10-27 DIAGNOSIS — Z01.818 PREOPERATIVE EXAMINATION: ICD-10-CM

## 2021-10-27 DIAGNOSIS — E78.2 MIXED HYPERLIPIDEMIA: ICD-10-CM

## 2021-10-27 DIAGNOSIS — M47.816 LUMBAR SPONDYLOSIS: ICD-10-CM

## 2021-10-27 DIAGNOSIS — I10 BENIGN ESSENTIAL HYPERTENSION: Primary | ICD-10-CM

## 2021-10-27 LAB
ANION GAP SERPL CALCULATED.3IONS-SCNC: 3 MMOL/L (ref 3–14)
BASOPHILS # BLD AUTO: 0 10E3/UL (ref 0–0.2)
BASOPHILS NFR BLD AUTO: 0 %
BUN SERPL-MCNC: 23 MG/DL (ref 7–30)
CALCIUM SERPL-MCNC: 9.5 MG/DL (ref 8.5–10.1)
CHLORIDE BLD-SCNC: 107 MMOL/L (ref 94–109)
CO2 SERPL-SCNC: 28 MMOL/L (ref 20–32)
CREAT SERPL-MCNC: 1.12 MG/DL (ref 0.66–1.25)
EOSINOPHIL # BLD AUTO: 0.1 10E3/UL (ref 0–0.7)
EOSINOPHIL NFR BLD AUTO: 2 %
ERYTHROCYTE [DISTWIDTH] IN BLOOD BY AUTOMATED COUNT: 13.1 % (ref 10–15)
GFR SERPL CREATININE-BSD FRML MDRD: 71 ML/MIN/1.73M2
GLUCOSE BLD-MCNC: 96 MG/DL (ref 70–99)
HCT VFR BLD AUTO: 48.9 % (ref 40–53)
HGB BLD-MCNC: 16.4 G/DL (ref 13.3–17.7)
IMM GRANULOCYTES # BLD: 0 10E3/UL
IMM GRANULOCYTES NFR BLD: 0 %
LYMPHOCYTES # BLD AUTO: 1.6 10E3/UL (ref 0.8–5.3)
LYMPHOCYTES NFR BLD AUTO: 28 %
MCH RBC QN AUTO: 28.8 PG (ref 26.5–33)
MCHC RBC AUTO-ENTMCNC: 33.5 G/DL (ref 31.5–36.5)
MCV RBC AUTO: 86 FL (ref 78–100)
MONOCYTES # BLD AUTO: 0.5 10E3/UL (ref 0–1.3)
MONOCYTES NFR BLD AUTO: 9 %
NEUTROPHILS # BLD AUTO: 3.5 10E3/UL (ref 1.6–8.3)
NEUTROPHILS NFR BLD AUTO: 61 %
NRBC # BLD AUTO: 0 10E3/UL
NRBC BLD AUTO-RTO: 0 /100
PLATELET # BLD AUTO: 253 10E3/UL (ref 150–450)
POTASSIUM BLD-SCNC: 4.1 MMOL/L (ref 3.4–5.3)
RBC # BLD AUTO: 5.69 10E6/UL (ref 4.4–5.9)
SODIUM SERPL-SCNC: 138 MMOL/L (ref 133–144)
WBC # BLD AUTO: 5.8 10E3/UL (ref 4–11)

## 2021-10-27 PROCEDURE — 36415 COLL VENOUS BLD VENIPUNCTURE: CPT | Performed by: FAMILY MEDICINE

## 2021-10-27 PROCEDURE — 85025 COMPLETE CBC W/AUTO DIFF WBC: CPT | Performed by: FAMILY MEDICINE

## 2021-10-27 PROCEDURE — 93000 ELECTROCARDIOGRAM COMPLETE: CPT | Mod: 77 | Performed by: INTERNAL MEDICINE

## 2021-10-27 PROCEDURE — 80048 BASIC METABOLIC PNL TOTAL CA: CPT | Performed by: FAMILY MEDICINE

## 2021-10-27 PROCEDURE — 99214 OFFICE O/P EST MOD 30 MIN: CPT | Performed by: FAMILY MEDICINE

## 2021-10-27 RX ORDER — EPINEPHRINE 0.3 MG/.3ML
INJECTION SUBCUTANEOUS
COMMUNITY
Start: 2021-08-06

## 2021-10-27 ASSESSMENT — ANXIETY QUESTIONNAIRES
6. BECOMING EASILY ANNOYED OR IRRITABLE: NOT AT ALL
3. WORRYING TOO MUCH ABOUT DIFFERENT THINGS: NOT AT ALL
4. TROUBLE RELAXING: NOT AT ALL
5. BEING SO RESTLESS THAT IT IS HARD TO SIT STILL: NOT AT ALL
2. NOT BEING ABLE TO STOP OR CONTROL WORRYING: NOT AT ALL
7. FEELING AFRAID AS IF SOMETHING AWFUL MIGHT HAPPEN: NOT AT ALL
GAD7 TOTAL SCORE: 0
1. FEELING NERVOUS, ANXIOUS, OR ON EDGE: NOT AT ALL

## 2021-10-27 ASSESSMENT — PATIENT HEALTH QUESTIONNAIRE - PHQ9: SUM OF ALL RESPONSES TO PHQ QUESTIONS 1-9: 0

## 2021-10-27 ASSESSMENT — PAIN SCALES - GENERAL: PAINLEVEL: NO PAIN (0)

## 2021-10-27 ASSESSMENT — MIFFLIN-ST. JEOR: SCORE: 1926.43

## 2021-10-27 NOTE — PROGRESS NOTES
[facility] - [place]  [address] [facility] [address]  Phone: [phone]  Primary Provider: Renetta Mac  Pre-op Performing Provider: RENETTA MAC    PREOPERATIVE EVALUATION:  Today's date: 10/27/2021    Josef Rod is a 61 year old male who presents for a preoperative evaluation.    Surgical Information:  Surgery/Procedure: Back surgery  Surgery Location: [facility]  Surgeon: Dr. Nicholson   Surgery Date: 11/5/2021  Time of Surgery: tbd  Where patient plans to recover: Other: has to spend 1 night in hospital then will be discharged home  Fax number for surgical facility:     Type of Anesthesia Anticipated: to be determined    Assessment & Plan     The proposed surgical procedure is considered INTERMEDIATE risk.    Preoperative examination  Preoperative medical clearance performed    Lumbar spondylosis  L2 L3 spinal stenosis.  Surgical management per Spine Surgery    HTN (hypertension)  Controlled with the current regimen.  - CBC with Platelets & Differential; Future  - Basic metabolic panel; Future  - EKG 12-lead complete w/read - Clinics  - CBC with Platelets & Differential  - Basic metabolic panel    Dyslipidemia  Stable           Risks and Recommendations:  The patient has the following additional risks and recommendations for perioperative complications:   - No identified additional risk factors other than previously addressed      RECOMMENDATION:  APPROVAL GIVEN to proceed with proposed procedure, without further diagnostic evaluation.  56}    Subjective     HPI related to upcoming procedure: Brenden has a history of recurrent low back pain with radiulopathy and has failed conservative management including lumbar epidural steroid injection. MRI was performed which showed severe cental spinal stenosis at L2 L3. He was seen by Spine Surgery where it was felt the patient would benefit from surgical management.  I was asked to see him for preoperative medical  clearance.      Preop Questions 10/26/2021   1. Have you ever had a heart attack or stroke? No   2. Have you ever had surgery on your heart or blood vessels, such as a stent placement, a coronary artery bypass, or surgery on an artery in your head, neck, heart, or legs? No   3. Do you have chest pain with activity? No   4. Do you have a history of  heart failure? No   5. Do you currently have a cold, bronchitis or symptoms of other infection? No   6. Do you have a cough, shortness of breath, or wheezing? No   7. Do you or anyone in your family have previous history of blood clots? No   8. Do you or does anyone in your family have a serious bleeding problem such as prolonged bleeding following surgeries or cuts? No   9. Have you ever had problems with anemia or been told to take iron pills? No   10. Have you had any abnormal blood loss such as black, tarry or bloody stools? No   11. Have you ever had a blood transfusion? No   12. Are you willing to have a blood transfusion if it is medically needed before, during, or after your surgery? Yes   13. Have you or any of your relatives ever had problems with anesthesia? No   14. Do you have sleep apnea, excessive snoring or daytime drowsiness? No   15. Do you have any artifical heart valves or other implanted medical devices like a pacemaker, defibrillator, or continuous glucose monitor? No   16. Do you have artificial joints? YES - total knee   17. Are you allergic to latex? No       Health Care Directive:  Patient does not have a Health Care Directive or Living Will: Discussed advance care planning with patient; however, patient declined at this time.    Preoperative Review of :   reviewed - no record of controlled substances prescribed.      Status of Chronic Conditions:  HYPERLIPIDEMIA - Patient has a long history of significant Hyperlipidemia requiring medication for treatment with recent good control. Patient reports no problems or side effects with the  medication.     HYPERTENSION - Patient has longstanding history of HTN , currently denies any symptoms referable to elevated blood pressure. Specifically denies chest pain, palpitations, dyspnea, orthopnea, PND or peripheral edema. Blood pressure readings have been in normal range. Current medication regimen is as listed below. Patient denies any side effects of medication.       Review of Systems  Constitutional, neuro, ENT, endocrine, pulmonary, cardiac, gastrointestinal, genitourinary, musculoskeletal, integument and psychiatric systems are negative, except as otherwise noted.    Patient Active Problem List    Diagnosis Date Noted     Nephrolithiasis 08/28/2020     Priority: Medium     Primary osteoarthritis of both knees 11/20/2017     Priority: Medium     Morbid obesity (H) 10/01/2017     Priority: Medium     HTN (hypertension) 07/05/2016     Priority: Medium     ACP (advance care planning) 06/28/2016     Priority: Medium     Advance Care Planning 6/28/2016: ACP Review of Chart / Resources Provided:  Reviewed chart for advance care plan.  Josef Rod has no plan or code status on file. Discussed available resources and provided with information. Confirmed code status reflects current choices pending further ACP discussions.  Confirmed/documented legally designated decision makers.  Added by Reema Kumar             Gout 12/23/2014     Priority: Medium     Dyslipidemia 10/29/2013     Priority: Medium      Past Medical History:   Diagnosis Date     Dyslipidemia 2/6/2001     Hypertension      Obesity 8/21/2012     Past Surgical History:   Procedure Laterality Date     arthroscopy  2005    RT     ARTHROSCOPY KNEE  2005    LT     IR CONSULTATION FOR IR EXAM  6/23/2021     TONSILLECTOMY  19??     Current Outpatient Medications   Medication Sig Dispense Refill     amLODIPine (NORVASC) 5 MG tablet Take 1 tablet (5 mg) by mouth daily 90 tablet 1     aspirin 325 MG tablet Take 1 tablet by mouth daily        atorvastatin (LIPITOR) 20 MG tablet TAKE ONE TABLET BY MOUTH EVERY DAY 90 tablet 3     cetirizine (ZYRTEC) 10 MG tablet Take 1 tablet (10 mg) by mouth daily 30 tablet 0     Cholecalciferol (VITAMIN D) 2000 UNITS tablet Take 2,000 Units by mouth daily 100 tablet 3     methylPREDNISolone (MEDROL DOSEPAK) 4 MG tablet therapy pack Follow Package Directions 21 tablet 0     order for DME Equipment being ordered: compression stockings  Size to fit 1 each 0     order for DME Equipment being ordered: custom orthotics 1 Device 0     order for DME Equipment being ordered: Planter Fascitis Nightime Boot 1 Device 1       Allergies   Allergen Reactions     Penicillins         Social History     Tobacco Use     Smoking status: Never Smoker     Smokeless tobacco: Never Used     Tobacco comment: no passive exposure   Substance Use Topics     Alcohol use: Yes     Comment: beer and liquor monthly     Family History   Problem Relation Age of Onset     Arthritis Mother      Heart Disease Father      History   Drug Use No         Objective     There were no vitals taken for this visit.    Physical Exam    GENERAL APPEARANCE: healthy, alert and no distress     EYES: EOMI,  PERRL     HENT: ear canals and TM's normal and nose and mouth without ulcers or lesions     NECK: no adenopathy, no asymmetry, masses, or scars and thyroid normal to palpation     RESP: lungs clear to auscultation - no rales, rhonchi or wheezes     CV: regular rates and rhythm, normal S1 S2, no S3 or S4 and no murmur, click or rub     ABDOMEN:  soft, nontender, no HSM or masses and bowel sounds normal     MS: extremities normal- no gross deformities noted, no evidence of inflammation in joints, FROM in all extremities.     SKIN: no suspicious lesions or rashes     NEURO: Normal strength and tone, sensory exam grossly normal, mentation intact and speech normal     PSYCH: mentation appears normal. and affect normal/bright     LYMPHATICS: No cervical adenopathy    Recent  Labs   Lab Test 08/24/20  1019 08/19/20  2125   HGB 15.8 15.8    247   INR  --  1.08    139   POTASSIUM 4.2 3.8   CR 0.98 1.32*        Diagnostics:  Recent Results (from the past 240 hour(s))   Basic metabolic panel    Collection Time: 10/27/21  9:02 AM   Result Value Ref Range    Sodium 138 133 - 144 mmol/L    Potassium 4.1 3.4 - 5.3 mmol/L    Chloride 107 94 - 109 mmol/L    Carbon Dioxide (CO2) 28 20 - 32 mmol/L    Anion Gap 3 3 - 14 mmol/L    Urea Nitrogen 23 7 - 30 mg/dL    Creatinine 1.12 0.66 - 1.25 mg/dL    Calcium 9.5 8.5 - 10.1 mg/dL    Glucose 96 70 - 99 mg/dL    GFR Estimate 71 >60 mL/min/1.73m2   CBC with platelets and differential    Collection Time: 10/27/21  9:02 AM   Result Value Ref Range    WBC Count 5.8 4.0 - 11.0 10e3/uL    RBC Count 5.69 4.40 - 5.90 10e6/uL    Hemoglobin 16.4 13.3 - 17.7 g/dL    Hematocrit 48.9 40.0 - 53.0 %    MCV 86 78 - 100 fL    MCH 28.8 26.5 - 33.0 pg    MCHC 33.5 31.5 - 36.5 g/dL    RDW 13.1 10.0 - 15.0 %    Platelet Count 253 150 - 450 10e3/uL    % Neutrophils 61 %    % Lymphocytes 28 %    % Monocytes 9 %    % Eosinophils 2 %    % Basophils 0 %    % Immature Granulocytes 0 %    NRBCs per 100 WBC 0 <1 /100    Absolute Neutrophils 3.5 1.6 - 8.3 10e3/uL    Absolute Lymphocytes 1.6 0.8 - 5.3 10e3/uL    Absolute Monocytes 0.5 0.0 - 1.3 10e3/uL    Absolute Eosinophils 0.1 0.0 - 0.7 10e3/uL    Absolute Basophils 0.0 0.0 - 0.2 10e3/uL    Absolute Immature Granulocytes 0.0 <=0.0 10e3/uL    Absolute NRBCs 0.0 10e3/uL      EKG: appears normal, NSR, normal axis, normal intervals, no acute ST/T changes c/w ischemia, no LVH by voltage criteria, unchanged from previous tracings       Revised Cardiac Risk Index (RCRI):  The patient has the following serious cardiovascular risks for perioperative complications:   - No serious cardiac risks = 0 points     RCRI Interpretation: 0 points: Class I (very low risk - 0.4% complication rate)           Signed Electronically by: Alban  MD Stewart  Copy of this evaluation report is provided to requesting physician.

## 2021-10-28 ASSESSMENT — ANXIETY QUESTIONNAIRES: GAD7 TOTAL SCORE: 0

## 2021-11-01 ENCOUNTER — OFFICE VISIT (OUTPATIENT)
Dept: FAMILY MEDICINE | Facility: OTHER | Age: 61
End: 2021-11-01
Attending: SPECIALIST
Payer: COMMERCIAL

## 2021-11-01 DIAGNOSIS — Z01.818 PREOP GENERAL PHYSICAL EXAM: Primary | ICD-10-CM

## 2021-11-01 PROCEDURE — U0005 INFEC AGEN DETEC AMPLI PROBE: HCPCS

## 2021-11-01 PROCEDURE — U0003 INFECTIOUS AGENT DETECTION BY NUCLEIC ACID (DNA OR RNA); SEVERE ACUTE RESPIRATORY SYNDROME CORONAVIRUS 2 (SARS-COV-2) (CORONAVIRUS DISEASE [COVID-19]), AMPLIFIED PROBE TECHNIQUE, MAKING USE OF HIGH THROUGHPUT TECHNOLOGIES AS DESCRIBED BY CMS-2020-01-R: HCPCS

## 2021-11-03 LAB — SARS-COV-2 RNA RESP QL NAA+PROBE: NEGATIVE

## 2021-11-22 ENCOUNTER — TELEPHONE (OUTPATIENT)
Dept: FAMILY MEDICINE | Facility: OTHER | Age: 61
End: 2021-11-22
Payer: COMMERCIAL

## 2021-11-22 NOTE — TELEPHONE ENCOUNTER
2:58 PM    Reason for Call: Phone Call    Description: Patient called and states that he would like to est care with Bj Estrada. Patient states that his father had been a long time patient before he passed away and would like to see Dr Estrada. Advised patient that Dr Estrada is not accepting new patients at this time. Patient would like to talk to someone in Dr Estrada's office in regards to this.     Was an appointment offered for this call? No  If yes : Appointment type              Date    Preferred method for responding to this message: Telephone Call  What is your phone number ? [phone]    If we cannot reach you directly, may we leave a detailed response at the number you provided? Yes    Can this message wait until your PCP/provider returns, if available today? Not applicable, provider is in today     Gracia Decker

## 2021-12-13 ENCOUNTER — TRANSFERRED RECORDS (OUTPATIENT)
Dept: HEALTH INFORMATION MANAGEMENT | Facility: CLINIC | Age: 61
End: 2021-12-13

## 2022-01-31 NOTE — PROGRESS NOTES
"  Assessment & Plan     Dyslipidemia  Stable.  Recheck in the spring at cpe.      HTN (hypertension)  Increased today.  Generally lower.  Last check was lower.  Tough weekend with eating and drinking.  We will hold off until May to see if it comes down.  He is going to focus on diet/ex until then.      Entrapment of right ulnar nerve  By history.  Intermittent problem we reviewed.  He will monitor elbow position.  No red flags.      Lip lesion  Biggest issue today.  Several weeks of ongoing lesion that he keeps accidentally biting and it gets worse then better.  Consider an excision.  He will monitor closely until our cpe in May.  If getting larger before, he can send msg and I will refer to Dr. Vee for consideration of excision.  He understands my concerns.          30 minutes spent on the date of the encounter doing chart review, patient visit and documentation        BMI:   Estimated body mass index is 41.51 kg/m  as calculated from the following:    Height as of 10/27/21: 1.74 m (5' 8.5\").    Weight as of this encounter: 125.6 kg (277 lb).           No follow-ups on file.    Bj Estrada MD  [facility]    Kyle Wilcox is a 61 year old who presents for the following health issues     HPI   New Patient/Transfer of Care     Hyperlipidemia Follow-Up      Are you regularly taking any medication or supplement to lower your cholesterol?   Yes- Lipitor 20mg    Are you having muscle aches or other side effects that you think could be caused by your cholesterol lowering medication?  No    Hypertension Follow-up      Do you check your blood pressure regularly outside of the clinic? No     Are you following a low salt diet? No    Are your blood pressures ever more than 140 on the top number (systolic) OR more   than 90 on the bottom number (diastolic), for example 140/90? n/a      How many servings of fruits and vegetables do you eat daily?  0-1    On average, how many sweetened " beverages do you drink each day (Examples: soda, juice, sweet tea, etc.  Do NOT count diet or artificially sweetened beverages)?   0    How many days per week do you exercise enough to make your heart beat faster? 3 or less    How many minutes a day do you exercise enough to make your heart beat faster? 9 or less    How many days per week do you miss taking your medication? 0        Review of Systems   Constitutional, HEENT, cardiovascular, pulmonary, gi and gu systems are negative, except as otherwise noted.      Objective    BP (!) 160/80   Pulse 76   Temp 97  F (36.1  C)   Resp 18   Wt 125.6 kg (277 lb)   SpO2 96%   BMI 41.51 kg/m    Body mass index is 41.51 kg/m .  Physical Exam   GENERAL: healthy, alert and no distress  Upper lip:  Papule which is removed on the surface.  Healing area.  About 1 cm or so.    NECK: no adenopathy, no asymmetry, masses, or scars and thyroid normal to palpation  RESP: lungs clear to auscultation - no rales, rhonchi or wheezes  CV: regular rate and rhythm, normal S1 S2, no S3 or S4, no murmur, click or rub, no peripheral edema and peripheral pulses strong  ABDOMEN: soft, nontender, no hepatosplenomegaly, no masses and bowel sounds normal  MS: no gross musculoskeletal defects noted, no edema        Full labs and cpe in May and we did make an appt.

## 2022-02-03 ENCOUNTER — OFFICE VISIT (OUTPATIENT)
Dept: FAMILY MEDICINE | Facility: OTHER | Age: 62
End: 2022-02-03
Attending: FAMILY MEDICINE
Payer: COMMERCIAL

## 2022-02-03 VITALS
HEART RATE: 76 BPM | DIASTOLIC BLOOD PRESSURE: 80 MMHG | RESPIRATION RATE: 18 BRPM | SYSTOLIC BLOOD PRESSURE: 160 MMHG | BODY MASS INDEX: 41.51 KG/M2 | WEIGHT: 277 LBS | OXYGEN SATURATION: 96 % | TEMPERATURE: 97 F

## 2022-02-03 DIAGNOSIS — E78.2 MIXED HYPERLIPIDEMIA: Primary | ICD-10-CM

## 2022-02-03 DIAGNOSIS — I10 BENIGN ESSENTIAL HYPERTENSION: ICD-10-CM

## 2022-02-03 DIAGNOSIS — G56.21 ENTRAPMENT OF RIGHT ULNAR NERVE: ICD-10-CM

## 2022-02-03 DIAGNOSIS — K13.0 LIP LESION: ICD-10-CM

## 2022-02-03 PROCEDURE — 99214 OFFICE O/P EST MOD 30 MIN: CPT | Performed by: FAMILY MEDICINE

## 2022-02-03 ASSESSMENT — ANXIETY QUESTIONNAIRES
6. BECOMING EASILY ANNOYED OR IRRITABLE: NOT AT ALL
1. FEELING NERVOUS, ANXIOUS, OR ON EDGE: NOT AT ALL
5. BEING SO RESTLESS THAT IT IS HARD TO SIT STILL: NOT AT ALL
GAD7 TOTAL SCORE: 0
7. FEELING AFRAID AS IF SOMETHING AWFUL MIGHT HAPPEN: NOT AT ALL
4. TROUBLE RELAXING: NOT AT ALL
2. NOT BEING ABLE TO STOP OR CONTROL WORRYING: NOT AT ALL
3. WORRYING TOO MUCH ABOUT DIFFERENT THINGS: NOT AT ALL

## 2022-02-03 ASSESSMENT — PATIENT HEALTH QUESTIONNAIRE - PHQ9: SUM OF ALL RESPONSES TO PHQ QUESTIONS 1-9: 0

## 2022-02-03 ASSESSMENT — PAIN SCALES - GENERAL: PAINLEVEL: NO PAIN (0)

## 2022-02-03 NOTE — NURSING NOTE
"Chief Complaint   Patient presents with     Establish Care       Initial BP (!) 160/80   Pulse 76   Temp 97  F (36.1  C)   Resp 18   Wt 125.6 kg (277 lb)   SpO2 96%   BMI 41.51 kg/m   Estimated body mass index is 41.51 kg/m  as calculated from the following:    Height as of 10/27/21: 1.74 m (5' 8.5\").    Weight as of this encounter: 125.6 kg (277 lb).  Medication Reconciliation: yuliya Warner  "

## 2022-02-04 ASSESSMENT — ANXIETY QUESTIONNAIRES: GAD7 TOTAL SCORE: 0

## 2022-02-24 ENCOUNTER — TRANSFERRED RECORDS (OUTPATIENT)
Dept: HEALTH INFORMATION MANAGEMENT | Facility: CLINIC | Age: 62
End: 2022-02-24

## 2022-05-12 PROBLEM — G47.00 INSOMNIA: Status: ACTIVE | Noted: 2021-11-05

## 2022-05-12 PROBLEM — M67.40 GANGLION CYST: Status: ACTIVE | Noted: 2021-11-05

## 2022-05-12 PROBLEM — R35.1 NOCTURIA: Status: ACTIVE | Noted: 2021-11-05

## 2022-05-14 ENCOUNTER — HEALTH MAINTENANCE LETTER (OUTPATIENT)
Age: 62
End: 2022-05-14

## 2022-05-18 ENCOUNTER — OFFICE VISIT (OUTPATIENT)
Dept: FAMILY MEDICINE | Facility: OTHER | Age: 62
End: 2022-05-18
Attending: FAMILY MEDICINE
Payer: COMMERCIAL

## 2022-05-18 VITALS
OXYGEN SATURATION: 95 % | SYSTOLIC BLOOD PRESSURE: 138 MMHG | TEMPERATURE: 97.2 F | DIASTOLIC BLOOD PRESSURE: 74 MMHG | BODY MASS INDEX: 38.36 KG/M2 | HEART RATE: 64 BPM | HEIGHT: 69 IN | WEIGHT: 259 LBS

## 2022-05-18 DIAGNOSIS — Z11.4 SCREENING FOR HIV (HUMAN IMMUNODEFICIENCY VIRUS): ICD-10-CM

## 2022-05-18 DIAGNOSIS — Z11.59 NEED FOR HEPATITIS C SCREENING TEST: ICD-10-CM

## 2022-05-18 DIAGNOSIS — E78.2 MIXED HYPERLIPIDEMIA: Primary | ICD-10-CM

## 2022-05-18 DIAGNOSIS — E78.5 DYSLIPIDEMIA: ICD-10-CM

## 2022-05-18 DIAGNOSIS — Z12.5 SCREENING FOR PROSTATE CANCER: ICD-10-CM

## 2022-05-18 LAB
ALBUMIN SERPL-MCNC: 4.3 G/DL (ref 3.4–5)
ALP SERPL-CCNC: 79 U/L (ref 40–150)
ALT SERPL W P-5'-P-CCNC: 47 U/L (ref 0–70)
ANION GAP SERPL CALCULATED.3IONS-SCNC: 6 MMOL/L (ref 3–14)
AST SERPL W P-5'-P-CCNC: 39 U/L (ref 0–45)
BILIRUB SERPL-MCNC: 0.7 MG/DL (ref 0.2–1.3)
BUN SERPL-MCNC: 19 MG/DL (ref 7–30)
CALCIUM SERPL-MCNC: 9.3 MG/DL (ref 8.5–10.1)
CHLORIDE BLD-SCNC: 103 MMOL/L (ref 94–109)
CHOLEST SERPL-MCNC: 156 MG/DL
CO2 SERPL-SCNC: 27 MMOL/L (ref 20–32)
CREAT SERPL-MCNC: 1.02 MG/DL (ref 0.66–1.25)
FASTING STATUS PATIENT QL REPORTED: YES
GFR SERPL CREATININE-BSD FRML MDRD: 84 ML/MIN/1.73M2
GLUCOSE BLD-MCNC: 99 MG/DL (ref 70–99)
HDLC SERPL-MCNC: 46 MG/DL
LDLC SERPL CALC-MCNC: 92 MG/DL
NONHDLC SERPL-MCNC: 110 MG/DL
POTASSIUM BLD-SCNC: 4.1 MMOL/L (ref 3.4–5.3)
PROT SERPL-MCNC: 8 G/DL (ref 6.8–8.8)
PSA SERPL-MCNC: 2.07 UG/L (ref 0–4)
SODIUM SERPL-SCNC: 136 MMOL/L (ref 133–144)
TRIGL SERPL-MCNC: 92 MG/DL

## 2022-05-18 PROCEDURE — 84153 ASSAY OF PSA TOTAL: CPT | Performed by: FAMILY MEDICINE

## 2022-05-18 PROCEDURE — 87389 HIV-1 AG W/HIV-1&-2 AB AG IA: CPT | Performed by: FAMILY MEDICINE

## 2022-05-18 PROCEDURE — 80053 COMPREHEN METABOLIC PANEL: CPT | Performed by: FAMILY MEDICINE

## 2022-05-18 PROCEDURE — 86803 HEPATITIS C AB TEST: CPT | Performed by: FAMILY MEDICINE

## 2022-05-18 PROCEDURE — 80061 LIPID PANEL: CPT | Performed by: FAMILY MEDICINE

## 2022-05-18 PROCEDURE — 99396 PREV VISIT EST AGE 40-64: CPT | Performed by: FAMILY MEDICINE

## 2022-05-18 PROCEDURE — 36415 COLL VENOUS BLD VENIPUNCTURE: CPT | Performed by: FAMILY MEDICINE

## 2022-05-18 RX ORDER — ATORVASTATIN CALCIUM 20 MG/1
20 TABLET, FILM COATED ORAL DAILY
Qty: 90 TABLET | Refills: 3 | Status: SHIPPED | OUTPATIENT
Start: 2022-05-18 | End: 2023-04-04

## 2022-05-18 ASSESSMENT — PAIN SCALES - GENERAL: PAINLEVEL: NO PAIN (0)

## 2022-05-18 NOTE — PROGRESS NOTES
SUBJECTIVE:   CC: Josef Rod is an 61 year old male who presents for preventative health visit.         Healthy Habits:     Getting at least 3 servings of Calcium per day:  Yes    Bi-annual eye exam:  Yes    Dental care twice a year:  NO    Sleep apnea or symptoms of sleep apnea:  None    Diet:  Low salt    Frequency of exercise:  4-5 days/week    Duration of exercise:  30-45 minutes    Taking medications regularly:  Yes    Medication side effects:  Not applicable    PHQ-2 Total Score: 0    Additional concerns today:  No              Today's PHQ-2 Score:   PHQ-2 ( 1999 [employer]) 5/16/2022   Q1: Little interest or pleasure in doing things 0   Q2: Feeling down, depressed or hopeless 0   PHQ-2 Score 0   PHQ-2 Total Score (12-17 Years)- Positive if 3 or more points; Administer PHQ-A if positive -   Q1: Little interest or pleasure in doing things Not at all   Q2: Feeling down, depressed or hopeless Not at all   PHQ-2 Score 0       Abuse: Current or Past(Physical, Sexual or Emotional)- No  Do you feel safe in your environment? Yes    Have you ever done Advance Care Planning? (For example, a Health Directive, POLST, or a discussion with a medical provider or your loved ones about your wishes): No, advance care planning information given to patient to review.  Patient plans to discuss their wishes with loved ones or provider.      Social History     Tobacco Use     Smoking status: Never Smoker     Smokeless tobacco: Never Used     Tobacco comment: no passive exposure   Substance Use Topics     Alcohol use: Yes     Comment: beer and liquor monthly         Alcohol Use 5/16/2022   Prescreen: >3 drinks/day or >7 drinks/week? Not Applicable       Last PSA:   PSA   Date Value Ref Range Status   05/14/2021 2.24 0 - 4 ug/L Final     Comment:     Assay Method:  Chemiluminescence using Siemens Vista analyzer       Reviewed orders with patient. Reviewed health maintenance and updated orders accordingly -   Lab work is in  "process    Reviewed and updated as needed this visit by clinical staff   Tobacco  Allergies  Meds                Reviewed and updated as needed this visit by Provider                   Past Medical History:   Diagnosis Date     Dyslipidemia 2/6/2001     Hypertension      Obesity 8/21/2012      Past Surgical History:   Procedure Laterality Date     arthroscopy  2005    RT     ARTHROSCOPY KNEE  2005    LT     IR CONSULTATION FOR IR EXAM  6/23/2021     TONSILLECTOMY  19??       Review of Systems  CONSTITUTIONAL: NEGATIVE for fever, chills, change in weight  INTEGUMENTARY/SKIN: NEGATIVE for worrisome rashes, moles or lesions  EYES: NEGATIVE for vision changes or irritation  ENT: NEGATIVE for ear, mouth and throat problems  RESP: NEGATIVE for significant cough or SOB  CV: NEGATIVE for chest pain, palpitations or peripheral edema  GI: NEGATIVE for nausea, abdominal pain, heartburn, or change in bowel habits   male: negative for dysuria, hematuria, decreased urinary stream, erectile dysfunction, urethral discharge  MUSCULOSKELETAL: NEGATIVE for significant arthralgias or myalgia  NEURO: NEGATIVE for weakness, dizziness or paresthesias  PSYCHIATRIC: NEGATIVE for changes in mood or affect    OBJECTIVE:   /74   Pulse 64   Temp 97.2  F (36.2  C)   Ht 1.74 m (5' 8.5\")   Wt 117.5 kg (259 lb)   SpO2 95%   BMI 38.81 kg/m      Physical Exam  GENERAL: healthy, alert and no distress  EYES: Eyes grossly normal to inspection, PERRL and conjunctivae and sclerae normal  HENT: ear canals and TM's normal, nose and mouth without ulcers or lesions  NECK: no adenopathy, no asymmetry, masses, or scars and thyroid normal to palpation  RESP: lungs clear to auscultation - no rales, rhonchi or wheezes  CV: regular rate and rhythm, normal S1 S2, no S3 or S4, no murmur, click or rub, no peripheral edema and peripheral pulses strong  ABDOMEN: soft, nontender, no hepatosplenomegaly, no masses and bowel sounds normal  MS: no gross " "musculoskeletal defects noted, no edema  SKIN: no suspicious lesions or rashes  NEURO: Normal strength and tone, mentation intact and speech normal  PSYCH: mentation appears normal, affect normal/bright    Diagnostic Test Results:  Labs reviewed in Epic    ASSESSMENT/PLAN:       ICD-10-CM    1. Mixed hyperlipidemia  E78.2 Comprehensive metabolic panel     Lipid Profile   2. Need for hepatitis C screening test  Z11.59 Hepatitis C Screen Reflex to HCV RNA Quant and Genotype   3. Screening for HIV (human immunodeficiency virus)  Z11.4 HIV Antigen Antibody Combo   4. Dyslipidemia  E78.5 atorvastatin (LIPITOR) 20 MG tablet   5. Screening for prostate cancer  Z12.5 Prostate Specific Antigen Screen           COUNSELING:   Reviewed preventive health counseling, as reflected in patient instructions    Estimated body mass index is 38.81 kg/m  as calculated from the following:    Height as of this encounter: 1.74 m (5' 8.5\").    Weight as of this encounter: 117.5 kg (259 lb).         He reports that he has never smoked. He has never used smokeless tobacco.      Counseling Resources:  ATP IV Guidelines  Pooled Cohorts Equation Calculator  FRAX Risk Assessment  ICSI Preventive Guidelines  Dietary Guidelines for Americans, 2010  USDA's MyPlate  ASA Prophylaxis  Lung CA Screening    Bj Estrada MD  [facility]  "

## 2022-05-18 NOTE — NURSING NOTE
"Chief Complaint   Patient presents with     Physical       Initial /74   Pulse 64   Temp 97.2  F (36.2  C)   Ht 1.74 m (5' 8.5\")   Wt 117.5 kg (259 lb)   SpO2 95%   BMI 38.81 kg/m   Estimated body mass index is 38.81 kg/m  as calculated from the following:    Height as of this encounter: 1.74 m (5' 8.5\").    Weight as of this encounter: 117.5 kg (259 lb).  Medication Reconciliation: complete  Katie Olguin, LPN  "

## 2022-05-19 LAB
HCV AB SERPL QL IA: NONREACTIVE
HIV 1+2 AB+HIV1 P24 AG SERPL QL IA: NONREACTIVE

## 2022-07-08 DIAGNOSIS — I10 BENIGN ESSENTIAL HYPERTENSION: ICD-10-CM

## 2022-07-08 RX ORDER — AMLODIPINE BESYLATE 5 MG/1
5 TABLET ORAL DAILY
Qty: 90 TABLET | Refills: 3 | Status: SHIPPED | OUTPATIENT
Start: 2022-07-08 | End: 2022-09-28

## 2022-09-04 ENCOUNTER — HEALTH MAINTENANCE LETTER (OUTPATIENT)
Age: 62
End: 2022-09-04

## 2022-09-21 ENCOUNTER — MYC MEDICAL ADVICE (OUTPATIENT)
Dept: FAMILY MEDICINE | Facility: OTHER | Age: 62
End: 2022-09-21

## 2022-09-26 NOTE — PROGRESS NOTES
"  Assessment & Plan     Benign essential hypertension  Not at goal.  Add terazosin.  1 month followup.   - amLODIPine (NORVASC) 10 MG tablet; Take 1 tablet (10 mg) by mouth daily    Benign prostatic hyperplasia with weak urinary stream  Normal psa in May.  Add hytrin titration.  Follow up in 1 month.    - terazosin (HYTRIN) 1 MG capsule; Take 1 capsule (1 mg) by mouth daily for 7 days, THEN 2 capsules (2 mg) daily for 7 days, THEN 3 capsules (3 mg) daily for 7 days, THEN 4 capsules (4 mg) daily for 30 days.    SOB (shortness of breath) on exertion  Reviewed.  Mild sx, strong fh of heart disease in father.  Recommend stress given he has a lower exercise capacity this year relative to last.    - EKG 12-lead complete w/read - (Clinic Performed)  - NM MPI Treadmill; Future      30 minutes spent on the date of the encounter doing chart review, patient visit and documentation        BMI:   Estimated body mass index is 39.26 kg/m  as calculated from the following:    Height as of 5/18/22: 1.74 m (5' 8.5\").    Weight as of this encounter: 118.8 kg (262 lb).           No follow-ups on file.    Bj Estrada MD  [facility]    Kyle Wilcox is a 61 year old, presenting for the following health issues:  Recheck Medication      HPI     Medication Followup of amlodipine     Taking Medication as prescribed: yes    Side Effects:  None    Medication Helping Symptoms:  NO-BP still high         Review of Systems   Constitutional, HEENT, cardiovascular, pulmonary, gi and gu systems are negative, except as otherwise noted.      Objective    BP (!) 154/84   Pulse 71   Temp 97.3  F (36.3  C)   Wt 118.8 kg (262 lb)   SpO2 97%   BMI 39.26 kg/m    Body mass index is 39.26 kg/m .  Physical Exam   GENERAL: healthy, alert and no distress  NECK: no adenopathy, no asymmetry, masses, or scars and thyroid normal to palpation  RESP: lungs clear to auscultation - no rales, rhonchi or wheezes  CV: regular rate and " rhythm, normal S1 S2, no S3 or S4, no murmur, click or rub, no peripheral edema and peripheral pulses strong  ABDOMEN: soft, nontender, no hepatosplenomegaly, no masses and bowel sounds normal  MS: no gross musculoskeletal defects noted, no edema    Stress test ordered.  EKG as baseline today.      Add terazosin    1 month followup.

## 2022-09-28 ENCOUNTER — OFFICE VISIT (OUTPATIENT)
Dept: FAMILY MEDICINE | Facility: OTHER | Age: 62
End: 2022-09-28
Attending: FAMILY MEDICINE
Payer: COMMERCIAL

## 2022-09-28 VITALS
WEIGHT: 262 LBS | OXYGEN SATURATION: 97 % | BODY MASS INDEX: 39.26 KG/M2 | HEART RATE: 71 BPM | SYSTOLIC BLOOD PRESSURE: 154 MMHG | DIASTOLIC BLOOD PRESSURE: 84 MMHG | TEMPERATURE: 97.3 F

## 2022-09-28 DIAGNOSIS — N40.1 BENIGN PROSTATIC HYPERPLASIA WITH WEAK URINARY STREAM: Primary | ICD-10-CM

## 2022-09-28 DIAGNOSIS — R39.12 BENIGN PROSTATIC HYPERPLASIA WITH WEAK URINARY STREAM: Primary | ICD-10-CM

## 2022-09-28 DIAGNOSIS — R39.12 BENIGN PROSTATIC HYPERPLASIA WITH WEAK URINARY STREAM: ICD-10-CM

## 2022-09-28 DIAGNOSIS — I10 BENIGN ESSENTIAL HYPERTENSION: ICD-10-CM

## 2022-09-28 DIAGNOSIS — R06.02 SOB (SHORTNESS OF BREATH) ON EXERTION: ICD-10-CM

## 2022-09-28 DIAGNOSIS — N40.1 BENIGN PROSTATIC HYPERPLASIA WITH WEAK URINARY STREAM: ICD-10-CM

## 2022-09-28 PROCEDURE — 99214 OFFICE O/P EST MOD 30 MIN: CPT | Performed by: FAMILY MEDICINE

## 2022-09-28 PROCEDURE — 93000 ELECTROCARDIOGRAM COMPLETE: CPT | Performed by: INTERNAL MEDICINE

## 2022-09-28 RX ORDER — TERAZOSIN 1 MG/1
CAPSULE ORAL
Qty: 70 CAPSULE | Refills: 0 | Status: SHIPPED | OUTPATIENT
Start: 2022-09-28 | End: 2022-10-03

## 2022-09-28 RX ORDER — AMLODIPINE BESYLATE 10 MG/1
10 TABLET ORAL DAILY
Qty: 90 TABLET | Refills: 3 | Status: SHIPPED | OUTPATIENT
Start: 2022-09-28 | End: 2023-11-01

## 2022-09-28 ASSESSMENT — PAIN SCALES - GENERAL: PAINLEVEL: NO PAIN (0)

## 2022-09-28 NOTE — NURSING NOTE
"Chief Complaint   Patient presents with     Recheck Medication       Initial BP (!) 154/84   Pulse 71   Temp 97.3  F (36.3  C)   Wt 118.8 kg (262 lb)   SpO2 97%   BMI 39.26 kg/m   Estimated body mass index is 39.26 kg/m  as calculated from the following:    Height as of 5/18/22: 1.74 m (5' 8.5\").    Weight as of this encounter: 118.8 kg (262 lb).  Medication Reconciliation: complete  Katie Olguin LPN  "

## 2022-10-03 ENCOUNTER — TELEPHONE (OUTPATIENT)
Dept: FAMILY MEDICINE | Facility: OTHER | Age: 62
End: 2022-10-03

## 2022-10-03 RX ORDER — TERAZOSIN 1 MG/1
CAPSULE ORAL
Qty: 70 CAPSULE | Refills: 0 | Status: SHIPPED | OUTPATIENT
Start: 2022-10-03 | End: 2022-11-10

## 2022-10-03 NOTE — TELEPHONE ENCOUNTER
Please do PA.  This is a titration and when we get settled in on the appropriate dose we will use that.  But, we have to titrate up.  Thanks.  Bj Estrada MD

## 2022-10-03 NOTE — TELEPHONE ENCOUNTER
Received message to start PA on Terazosin HCl 1MG capsules. Submitted on CMM. Waiting for a response.

## 2022-10-04 NOTE — TELEPHONE ENCOUNTER
Received APPROVAL from [facility] for Terazosin HCl 1MG capsules. Forms scanned to Epic.

## 2022-10-04 NOTE — TELEPHONE ENCOUNTER
Received APPROVAL from [facility] for Terazosin HCl 1MG capsules. Effective 10/03/2022-11/03/2022. Forms scanned to [facility].

## 2022-11-08 ENCOUNTER — HOSPITAL ENCOUNTER (OUTPATIENT)
Dept: NUCLEAR MEDICINE | Facility: HOSPITAL | Age: 62
Setting detail: NUCLEAR MEDICINE
Discharge: HOME | End: 2022-11-08
Attending: FAMILY MEDICINE
Payer: COMMERCIAL

## 2022-11-08 ENCOUNTER — HOSPITAL ENCOUNTER (OUTPATIENT)
Dept: CARDIOLOGY | Facility: HOSPITAL | Age: 62
Setting detail: NUCLEAR MEDICINE
Discharge: HOME | End: 2022-11-08
Attending: FAMILY MEDICINE
Payer: COMMERCIAL

## 2022-11-08 DIAGNOSIS — R06.02 SOB (SHORTNESS OF BREATH) ON EXERTION: ICD-10-CM

## 2022-11-08 PROCEDURE — 258N000003 HC RX IP 258 OP 636: Performed by: INTERNAL MEDICINE

## 2022-11-08 PROCEDURE — A9500 TC99M SESTAMIBI: HCPCS | Performed by: RADIOLOGY

## 2022-11-08 PROCEDURE — 93017 CV STRESS TEST TRACING ONLY: CPT

## 2022-11-08 PROCEDURE — 93016 CV STRESS TEST SUPVJ ONLY: CPT | Performed by: INTERNAL MEDICINE

## 2022-11-08 PROCEDURE — 93018 CV STRESS TEST I&R ONLY: CPT | Performed by: INTERNAL MEDICINE

## 2022-11-08 PROCEDURE — 78452 HT MUSCLE IMAGE SPECT MULT: CPT

## 2022-11-08 PROCEDURE — 343N000001 HC RX 343: Performed by: RADIOLOGY

## 2022-11-08 RX ORDER — SODIUM CHLORIDE 9 MG/ML
INJECTION, SOLUTION INTRAVENOUS ONCE
Status: COMPLETED | OUTPATIENT
Start: 2022-11-08 | End: 2022-11-08

## 2022-11-08 RX ADMIN — SODIUM CHLORIDE: 9 INJECTION, SOLUTION INTRAVENOUS at 09:01

## 2022-11-08 RX ADMIN — Medication 26.3 MILLICURIE: at 09:18

## 2022-11-08 RX ADMIN — Medication 8.3 MILLICURIE: at 07:13

## 2022-11-08 NOTE — PROGRESS NOTES
"  Assessment & Plan     HTN (hypertension)  Stable.  I rechecked and got the same.  Today, he got 180 inbetween mine and my nurse's checks.  He will keep in mind his monitor isn't always accurate.      Mixed hyperlipidemia  Reviewed.  It was up quite a bit last check.  He is on the lipitor.  Recheck at this longterm point through the year.    - Lipid Profile (Chol, Trig, HDL, LDL calc); Future  - Comprehensive metabolic panel (BMP + Alb, Alk Phos, ALT, AST, Total. Bili, TP); Future    Nice review of stress test.  He passed.  He is pleased.             BMI:   Estimated body mass index is 39.86 kg/m  as calculated from the following:    Height as of 5/18/22: 1.74 m (5' 8.5\").    Weight as of this encounter: 120.7 kg (266 lb).           No follow-ups on file.    Bj Estrada MD  [facility]    Kyle Wilcox is a 62 year old, presenting for the following health issues:  Recheck Medication      HPI     Medication Followup of amlodipine    Taking Medication as prescribed: Yes    Side Effects:  None    Medication Helping Symptoms:  yes    Pt is here to discuss stress test done on 11/8/22.        Review of Systems   Constitutional, HEENT, cardiovascular, pulmonary, gi and gu systems are negative, except as otherwise noted.      Objective    /78   Pulse 66   Temp 97.1  F (36.2  C)   Wt 120.7 kg (266 lb)   SpO2 97%   BMI 39.86 kg/m    Body mass index is 39.86 kg/m .  Physical Exam   GENERAL: healthy, alert and no distress  NECK: no adenopathy, no asymmetry, masses, or scars and thyroid normal to palpation  RESP: lungs clear to auscultation - no rales, rhonchi or wheezes  CV: regular rate and rhythm, normal S1 S2, no S3 or S4, no murmur, click or rub, no peripheral edema and peripheral pulses strong  ABDOMEN: soft, nontender, no hepatosplenomegaly, no masses and bowel sounds normal  MS: no gross musculoskeletal defects noted, no edema    Labs pending.                 "

## 2022-11-10 ENCOUNTER — OFFICE VISIT (OUTPATIENT)
Dept: FAMILY MEDICINE | Facility: OTHER | Age: 62
End: 2022-11-10
Attending: FAMILY MEDICINE
Payer: COMMERCIAL

## 2022-11-10 VITALS
HEART RATE: 66 BPM | TEMPERATURE: 97.1 F | WEIGHT: 266 LBS | BODY MASS INDEX: 39.86 KG/M2 | SYSTOLIC BLOOD PRESSURE: 134 MMHG | DIASTOLIC BLOOD PRESSURE: 78 MMHG | OXYGEN SATURATION: 97 %

## 2022-11-10 DIAGNOSIS — I10 BENIGN ESSENTIAL HYPERTENSION: Primary | ICD-10-CM

## 2022-11-10 DIAGNOSIS — E78.2 MIXED HYPERLIPIDEMIA: ICD-10-CM

## 2022-11-10 LAB
ALBUMIN SERPL BCG-MCNC: 4.7 G/DL (ref 3.5–5.2)
ALP SERPL-CCNC: 80 U/L (ref 40–129)
ALT SERPL W P-5'-P-CCNC: 38 U/L (ref 10–50)
ANION GAP SERPL CALCULATED.3IONS-SCNC: 12 MMOL/L (ref 7–15)
AST SERPL W P-5'-P-CCNC: 28 U/L (ref 10–50)
BILIRUB SERPL-MCNC: 0.7 MG/DL
BUN SERPL-MCNC: 20.7 MG/DL (ref 8–23)
CALCIUM SERPL-MCNC: 9.5 MG/DL (ref 8.8–10.2)
CHLORIDE SERPL-SCNC: 100 MMOL/L (ref 98–107)
CHOLEST SERPL-MCNC: 171 MG/DL
CREAT SERPL-MCNC: 1.15 MG/DL (ref 0.67–1.17)
CV BLOOD PRESSURE: 61 MMHG
CV STRESS MAX HR HE: 151
DEPRECATED HCO3 PLAS-SCNC: 25 MMOL/L (ref 22–29)
GFR SERPL CREATININE-BSD FRML MDRD: 72 ML/MIN/1.73M2
GLUCOSE SERPL-MCNC: 100 MG/DL (ref 70–99)
HDLC SERPL-MCNC: 39 MG/DL
LDLC SERPL CALC-MCNC: 92 MG/DL
NONHDLC SERPL-MCNC: 132 MG/DL
NUC STRESS EJECTION FRACTION: 67 %
POTASSIUM SERPL-SCNC: 4.3 MMOL/L (ref 3.4–5.3)
PROT SERPL-MCNC: 7.6 G/DL (ref 6.4–8.3)
RATE PRESSURE PRODUCT: NORMAL
SODIUM SERPL-SCNC: 137 MMOL/L (ref 136–145)
STRESS ECHO BASELINE DIASTOLIC HE: 82
STRESS ECHO BASELINE HR: 80 BPM
STRESS ECHO BASELINE SYSTOLIC BP: 170
STRESS ECHO CALCULATED PERCENT HR: 95 %
STRESS ECHO LAST STRESS DIASTOLIC BP: 64
STRESS ECHO LAST STRESS SYSTOLIC BP: 180
STRESS ECHO POST ESTIMATED WORKLOAD: 8.6 METS
STRESS ECHO POST EXERCISE DUR MIN: 7 MIN
STRESS ECHO POST EXERCISE DUR SEC: 0 SEC
STRESS ECHO TARGET HR: 159
TRIGL SERPL-MCNC: 202 MG/DL

## 2022-11-10 PROCEDURE — 99213 OFFICE O/P EST LOW 20 MIN: CPT | Performed by: FAMILY MEDICINE

## 2022-11-10 PROCEDURE — 80061 LIPID PANEL: CPT | Performed by: FAMILY MEDICINE

## 2022-11-10 PROCEDURE — 36415 COLL VENOUS BLD VENIPUNCTURE: CPT | Performed by: FAMILY MEDICINE

## 2022-11-10 PROCEDURE — 80053 COMPREHEN METABOLIC PANEL: CPT | Performed by: FAMILY MEDICINE

## 2022-11-10 ASSESSMENT — PAIN SCALES - GENERAL: PAINLEVEL: NO PAIN (0)

## 2022-11-10 NOTE — NURSING NOTE
"Chief Complaint   Patient presents with     Recheck Medication       Initial /78   Pulse 66   Temp 97.1  F (36.2  C)   Wt 120.7 kg (266 lb)   SpO2 97%   BMI 39.86 kg/m   Estimated body mass index is 39.86 kg/m  as calculated from the following:    Height as of 5/18/22: 1.74 m (5' 8.5\").    Weight as of this encounter: 120.7 kg (266 lb).  Medication Reconciliation: complete  Katie Olguin LPN  "

## 2023-04-04 DIAGNOSIS — E78.5 DYSLIPIDEMIA: ICD-10-CM

## 2023-04-04 RX ORDER — ATORVASTATIN CALCIUM 20 MG/1
20 TABLET, FILM COATED ORAL DAILY
Qty: 90 TABLET | Refills: 3 | Status: SHIPPED | OUTPATIENT
Start: 2023-04-04

## 2023-04-04 NOTE — TELEPHONE ENCOUNTER
Lipitor      Last Written Prescription Date:  05/18/22  Last Fill Quantity: 90,   # refills: 3  Last Office Visit: 11/10/22  Future Office visit:

## 2023-07-23 ENCOUNTER — HEALTH MAINTENANCE LETTER (OUTPATIENT)
Age: 63
End: 2023-07-23

## 2023-10-03 ENCOUNTER — TRANSFERRED RECORDS (OUTPATIENT)
Dept: HEALTH INFORMATION MANAGEMENT | Facility: CLINIC | Age: 63
End: 2023-10-03

## 2023-11-01 DIAGNOSIS — I10 BENIGN ESSENTIAL HYPERTENSION: ICD-10-CM

## 2023-11-01 RX ORDER — AMLODIPINE BESYLATE 10 MG/1
10 TABLET ORAL DAILY
Qty: 90 TABLET | Refills: 0 | Status: SHIPPED | OUTPATIENT
Start: 2023-11-01 | End: 2024-01-25

## 2023-11-01 NOTE — TELEPHONE ENCOUNTER
Amlodipine 10 mg      Last Written Prescription Date:  9-28-23  Last Fill Quantity: 90,   # refills: 3  Last Office Visit: 11-10-22    '

## 2024-01-17 ENCOUNTER — TELEPHONE (OUTPATIENT)
Dept: FAMILY MEDICINE | Facility: OTHER | Age: 64
End: 2024-01-17

## 2024-01-17 NOTE — PROGRESS NOTES
Assessment & Plan     Acute non-recurrent maxillary sinusitis  Reviewed.  PCN allergy.  Doxy coverage and follow.  Somewhat classic presentation.    - doxycycline hyclate (VIBRAMYCIN) 100 MG capsule; Take 1 capsule (100 mg) by mouth 2 times daily                No follow-ups on file.    Kyle Wilcox is a 63 year old, presenting for the following health issues:  URI    HPI     RESPIRATORY SYMPTOMS    Duration: 3 weeks   Description  nasal congestion, sore throat, cough, wheezing, fatigue/malaise, headache, and ears are plugged  Severity: moderate  Accompanying signs and symptoms: None  History (predisposing factors):  none  Precipitating or alleviating factors: None  Therapies tried and outcome:  ibuprofen, zoe negra, allegra              Objective    BP (!) 148/86   Pulse 90   Temp 97.3  F (36.3  C) (Tympanic)   Wt 107.5 kg (237 lb)   SpO2 97%   BMI 35.51 kg/m    Body mass index is 35.51 kg/m .    Review of Systems  Constitutional, HEENT, cardiovascular, pulmonary, gi and gu systems are negative, except as otherwise noted.  Physical Exam   GENERAL: alert and no distress  EYES: Eyes grossly normal to inspection, PERRL and conjunctivae and sclerae normal  HENT: normal cephalic/atraumatic, right ear: clear effusion and erythematous, left ear: clear effusion and erythematous, nose and mouth without ulcers or lesions, oropharynx clear, oral mucous membranes moist, and posterior pharyngeal erythema present.    NECK: no adenopathy, no asymmetry, masses, or scars  RESP: lungs clear to auscultation - no rales, rhonchi or wheezes  CV: regular rate and rhythm, normal S1 S2, no S3 or S4, no murmur, click or rub, no peripheral edema  ABDOMEN: soft, nontender, no hepatosplenomegaly, no masses and bowel sounds normal  MS: no gross musculoskeletal defects noted, no edema            Signed Electronically by: Bj Estrada MD

## 2024-01-17 NOTE — TELEPHONE ENCOUNTER
1:32 PM    Reason for Call: OVERBOOK    Patient is having the following symptoms: URI , Sinus, headache, chills Negative covid home test for 3 weeks .    The patient is requesting an appointment for today or tomorrow with Dr. Estrada.    Was an appointment offered for this call? No  If yes : Appointment type              Date    Preferred method for responding to this message: Telephone Call  What is your phone number ?   [phone]     If we cannot reach you directly, may we leave a detailed response at the number you provided?  Yes    Can this message wait until your PCP/provider returns, if unavailable today? Not applicable    Karo Helms

## 2024-01-18 ENCOUNTER — OFFICE VISIT (OUTPATIENT)
Dept: FAMILY MEDICINE | Facility: OTHER | Age: 64
End: 2024-01-18
Attending: FAMILY MEDICINE
Payer: COMMERCIAL

## 2024-01-18 VITALS
WEIGHT: 237 LBS | HEART RATE: 90 BPM | BODY MASS INDEX: 35.51 KG/M2 | SYSTOLIC BLOOD PRESSURE: 134 MMHG | TEMPERATURE: 97.3 F | OXYGEN SATURATION: 97 % | DIASTOLIC BLOOD PRESSURE: 76 MMHG

## 2024-01-18 DIAGNOSIS — J01.00 ACUTE NON-RECURRENT MAXILLARY SINUSITIS: Primary | ICD-10-CM

## 2024-01-18 PROCEDURE — 99213 OFFICE O/P EST LOW 20 MIN: CPT | Performed by: FAMILY MEDICINE

## 2024-01-18 RX ORDER — DOXYCYCLINE 100 MG/1
100 CAPSULE ORAL 2 TIMES DAILY
Qty: 20 CAPSULE | Refills: 0 | Status: SHIPPED | OUTPATIENT
Start: 2024-01-18 | End: 2024-01-30

## 2024-01-18 ASSESSMENT — PAIN SCALES - GENERAL: PAINLEVEL: NO PAIN (0)

## 2024-01-25 DIAGNOSIS — I10 BENIGN ESSENTIAL HYPERTENSION: ICD-10-CM

## 2024-01-25 RX ORDER — AMLODIPINE BESYLATE 10 MG/1
10 TABLET ORAL DAILY
Qty: 90 TABLET | Refills: 3 | Status: SHIPPED | OUTPATIENT
Start: 2024-01-25

## 2024-01-25 NOTE — TELEPHONE ENCOUNTER
Amlodipine      Last Written Prescription Date:  11/1/23  Last Fill Quantity: 90,   # refills: 0  Last Office Visit: 1/18/24  Future Office visit:       Routing refill request to provider for review/approval because:  Calcium Channel Blockers Protocol  Failed

## 2024-01-25 NOTE — TELEPHONE ENCOUNTER
Norvasc      Last Written Prescription Date:  11/01/23  Last Fill Quantity: 90,   # refills: 0  Last Office Visit: 01/18/24  Future Office visit:

## 2024-01-29 ENCOUNTER — TELEPHONE (OUTPATIENT)
Dept: FAMILY MEDICINE | Facility: OTHER | Age: 64
End: 2024-01-29

## 2024-01-29 NOTE — TELEPHONE ENCOUNTER
8:13 AM    Reason for Call: Phone Call    Description: Brenden would like a call from Dr. Estrada's nurse regarding kidney stones    Was an appointment offered for this call? Yes  If yes : Appointment type              Date    Preferred method for responding to this message: Telephone Call  What is your phone number ?   [phone]     If we cannot reach you directly, may we leave a detailed response at the number you provided? Yes    Can this message wait until your PCP/provider returns, if available today? Not applicable    Karo Helms

## 2024-01-29 NOTE — PROGRESS NOTES
Assessment & Plan     Pain in left testicle  Uncertain etiology.  No red flags and no tenderness on exam.  CBC normal.  UA   - UA Macroscopic with reflex to Microscopic and Culture; Future  - CBC with platelets and differential; Future  - UA Macroscopic with reflex to Microscopic and Culture  - CBC with platelets and differential  - levofloxacin (LEVAQUIN) 500 MG tablet; Take 1 tablet (500 mg) by mouth daily  - UA Microscopic with Reflex to Culture    Acute left-sided low back pain without sciatica  Reviewed.  Consider possible source of the pain.  UA concentrated with trace blood culture pending.  I doubt infectious cause in his urine and certainly the pain in his back is more MSK and doubt kidney.    - UA Macroscopic with reflex to Microscopic and Culture; Future  - CBC with platelets and differential; Future  - UA Macroscopic with reflex to Microscopic and Culture  - CBC with platelets and differential  - UA Microscopic with Reflex to Culture                No follow-ups on file.    Kyle Wilcox is a 63 year old, presenting for the following health issues:  Pain    HPI     Testicle pain     Duration: Sunday   Description (location/character/radiation): left testicle   Intensity:  moderate  Accompanying signs and symptoms: testicle pain, low back pain   History (similar episodes/previous evaluation): None  Precipitating or alleviating factors: None  Therapies tried and outcome: ibuprofen, tylenol and aleve               Review of Systems  Constitutional, HEENT, cardiovascular, pulmonary, gi and gu systems are negative, except as otherwise noted.      Objective    /74   Pulse 81   Temp 98.1  F (36.7  C) (Tympanic)   Wt 106.1 kg (234 lb)   SpO2 94%   BMI 35.06 kg/m    Body mass index is 35.06 kg/m .  Physical Exam   GENERAL: alert and no distress  EYES: Eyes grossly normal to inspection, PERRL and conjunctivae and sclerae normal  HENT: ear canals and TM's normal, nose and mouth without ulcers or  lesions  NECK: no adenopathy, no asymmetry, masses, or scars  RESP: lungs clear to auscultation - no rales, rhonchi or wheezes  CV: regular rate and rhythm, normal S1 S2, no S3 or S4, no murmur, click or rub, no peripheral edema  ABDOMEN: soft, nontender, no hepatosplenomegaly, no masses and bowel sounds normal   (male): normal male genitalia without lesions or urethral discharge, no hernia  MS: no gross musculoskeletal defects noted, no edema            Signed Electronically by: Bj Estrada MD

## 2024-01-30 ENCOUNTER — OFFICE VISIT (OUTPATIENT)
Dept: FAMILY MEDICINE | Facility: OTHER | Age: 64
End: 2024-01-30
Attending: FAMILY MEDICINE
Payer: COMMERCIAL

## 2024-01-30 VITALS
HEART RATE: 81 BPM | TEMPERATURE: 98.1 F | WEIGHT: 234 LBS | SYSTOLIC BLOOD PRESSURE: 136 MMHG | OXYGEN SATURATION: 94 % | DIASTOLIC BLOOD PRESSURE: 74 MMHG | BODY MASS INDEX: 35.06 KG/M2

## 2024-01-30 DIAGNOSIS — M54.50 ACUTE LEFT-SIDED LOW BACK PAIN WITHOUT SCIATICA: ICD-10-CM

## 2024-01-30 DIAGNOSIS — N50.812 PAIN IN LEFT TESTICLE: Primary | ICD-10-CM

## 2024-01-30 LAB
ALBUMIN UR-MCNC: 100 MG/DL
APPEARANCE UR: CLEAR
BACTERIA #/AREA URNS HPF: ABNORMAL /HPF
BASOPHILS # BLD AUTO: 0 10E3/UL (ref 0–0.2)
BASOPHILS NFR BLD AUTO: 0 %
BILIRUB UR QL STRIP: NEGATIVE
COLOR UR AUTO: YELLOW
EOSINOPHIL # BLD AUTO: 0.1 10E3/UL (ref 0–0.7)
EOSINOPHIL NFR BLD AUTO: 2 %
ERYTHROCYTE [DISTWIDTH] IN BLOOD BY AUTOMATED COUNT: 12.9 % (ref 10–15)
GLUCOSE UR STRIP-MCNC: NEGATIVE MG/DL
HCT VFR BLD AUTO: 49.4 % (ref 40–53)
HGB BLD-MCNC: 16.9 G/DL (ref 13.3–17.7)
HGB UR QL STRIP: ABNORMAL
HOLD SPECIMEN: NORMAL
IMM GRANULOCYTES # BLD: 0 10E3/UL
IMM GRANULOCYTES NFR BLD: 0 %
KETONES UR STRIP-MCNC: ABNORMAL MG/DL
LEUKOCYTE ESTERASE UR QL STRIP: NEGATIVE
LYMPHOCYTES # BLD AUTO: 2 10E3/UL (ref 0.8–5.3)
LYMPHOCYTES NFR BLD AUTO: 24 %
MCH RBC QN AUTO: 28.5 PG (ref 26.5–33)
MCHC RBC AUTO-ENTMCNC: 34.2 G/DL (ref 31.5–36.5)
MCV RBC AUTO: 83 FL (ref 78–100)
MONOCYTES # BLD AUTO: 0.7 10E3/UL (ref 0–1.3)
MONOCYTES NFR BLD AUTO: 9 %
MUCOUS THREADS #/AREA URNS LPF: PRESENT /LPF
NEUTROPHILS # BLD AUTO: 5.3 10E3/UL (ref 1.6–8.3)
NEUTROPHILS NFR BLD AUTO: 65 %
NITRATE UR QL: NEGATIVE
PH UR STRIP: 6 [PH] (ref 5–7)
PLATELET # BLD AUTO: 320 10E3/UL (ref 150–450)
RBC # BLD AUTO: 5.94 10E6/UL (ref 4.4–5.9)
RBC #/AREA URNS AUTO: ABNORMAL /HPF
SP GR UR STRIP: 1.02 (ref 1–1.03)
SQUAMOUS #/AREA URNS AUTO: ABNORMAL /LPF
UROBILINOGEN UR STRIP-ACNC: 0.2 E.U./DL
WBC # BLD AUTO: 8.1 10E3/UL (ref 4–11)
WBC #/AREA URNS AUTO: ABNORMAL /HPF

## 2024-01-30 PROCEDURE — 36415 COLL VENOUS BLD VENIPUNCTURE: CPT | Performed by: FAMILY MEDICINE

## 2024-01-30 PROCEDURE — 85025 COMPLETE CBC W/AUTO DIFF WBC: CPT | Performed by: FAMILY MEDICINE

## 2024-01-30 PROCEDURE — 81001 URINALYSIS AUTO W/SCOPE: CPT | Performed by: FAMILY MEDICINE

## 2024-01-30 PROCEDURE — 87086 URINE CULTURE/COLONY COUNT: CPT | Performed by: FAMILY MEDICINE

## 2024-01-30 PROCEDURE — 99214 OFFICE O/P EST MOD 30 MIN: CPT | Performed by: FAMILY MEDICINE

## 2024-01-30 RX ORDER — LEVOFLOXACIN 500 MG/1
500 TABLET, FILM COATED ORAL DAILY
Qty: 7 TABLET | Refills: 0 | Status: SHIPPED | OUTPATIENT
Start: 2024-01-30 | End: 2024-02-13

## 2024-01-30 ASSESSMENT — PAIN SCALES - GENERAL: PAINLEVEL: MODERATE PAIN (5)

## 2024-02-01 ENCOUNTER — TELEPHONE (OUTPATIENT)
Dept: FAMILY MEDICINE | Facility: OTHER | Age: 64
End: 2024-02-01

## 2024-02-01 DIAGNOSIS — N20.0 NEPHROLITHIASIS: Primary | ICD-10-CM

## 2024-02-01 NOTE — TELEPHONE ENCOUNTER
11:15 AM    Reason for Call: Phone Call    Description: Brenden is in the hospital in [place] with kidney stones and thinks he will need a urology referral. He just wants someone in [place],   He is requesting that the nurse call him     Was an appointment offered for this call? No  If yes : Appointment type              Date    Preferred method for responding to this message: Telephone Call  What is your phone number ?  [phone]     If we cannot reach you directly, may we leave a detailed response at the number you provided? Yes    Can this message wait until your PCP/provider returns, if available today? Not applicable    Karo Helms

## 2024-02-01 NOTE — TELEPHONE ENCOUNTER
Sent and thanks.  I hope he is ok down there.  That is a real bummer for their trip.  Thanks.  Bj Estrada MD

## 2024-02-02 LAB — BACTERIA UR CULT: NO GROWTH

## 2024-02-07 ENCOUNTER — TELEPHONE (OUTPATIENT)
Dept: FAMILY MEDICINE | Facility: OTHER | Age: 64
End: 2024-02-07

## 2024-02-07 NOTE — TELEPHONE ENCOUNTER
9:51 AM    Reason for Call: Phone Call    Description: Brenden called and stated that he missed a call and thought it was from us. No message left. Could you call him back if it was one of us ?     Was an appointment offered for this call? No  If yes : Appointment type              Date    Preferred method for responding to this message: Telephone Call  What is your phone number ?   [phone]     If we cannot reach you directly, may we leave a detailed response at the number you provided? Yes    Can this message wait until your PCP/provider returns, if available today? Not applicable    Karo Helms

## 2024-02-12 NOTE — PROGRESS NOTES
Preoperative Evaluation  [facility]  [address]  [facility] [address]  Phone: [phone]  Primary Provider: Mari Luevano  Pre-op Performing Provider: MARI LUEVANO  Feb 13, 2024       Brenden is a 63 year old, presenting for the following:  Pre-Op Exam      Surgical Information  Surgery/Procedure: kidney stone removal   Surgery Location: [facility]   Surgeon: Dr Maki   Surgery Date: 2/14/24  Time of Surgery: TBD  Where patient plans to recover: At home with family  Fax number for surgical facility: [phone]    Assessment & Plan     The proposed surgical procedure is considered LOW risk.    Preoperative examination  Doing well.  Healthy man.  Known stone disease.  No indication for further workup and procedure can proceed.    - Basic metabolic panel; Future  - CBC with Platelets & Differential; Future  - EKG 12-lead complete w/read - Clinics  - UA Macroscopic with reflex to Microscopic and Culture; Future  - Basic metabolic panel  - CBC with Platelets & Differential  - UA Macroscopic with reflex to Microscopic and Culture  - UA Microscopic with Reflex to Culture    Nephrolithiasis  Upcoming surgery.  Hematuria noted for same.   - UA Macroscopic with reflex to Microscopic and Culture; Future  - UA Macroscopic with reflex to Microscopic and Culture  - UA Microscopic with Reflex to Culture  - Urine Culture Aerobic Bacterial; Future            - No identified additional risk factors other than previously addressed        Recommendation  APPROVAL GIVEN to proceed with proposed procedure, without further diagnostic evaluation.          Subjective       HPI related to upcoming procedure: upcoming stone surgery        2/12/2024    10:47 AM   Preop Questions   1. Have you ever had a heart attack or stroke? No   2. Have you ever had surgery on your heart or blood vessels, such as a stent placement, a coronary artery bypass, or surgery on an artery in your  head, neck, heart, or legs? No   3. Do you have chest pain with activity? No   4. Do you have a history of  heart failure? No   5. Do you currently have a cold, bronchitis or symptoms of other infection? No   6. Do you have a cough, shortness of breath, or wheezing? No   7. Do you or anyone in your family have previous history of blood clots? No   8. Do you or does anyone in your family have a serious bleeding problem such as prolonged bleeding following surgeries or cuts? No   9. Have you ever had problems with anemia or been told to take iron pills? No   10. Have you had any abnormal blood loss such as black, tarry or bloody stools? No   11. Have you ever had a blood transfusion? No   12. Are you willing to have a blood transfusion if it is medically needed before, during, or after your surgery? Yes   13. Have you or any of your relatives ever had problems with anesthesia? No   14. Do you have sleep apnea, excessive snoring or daytime drowsiness? No   15. Do you have any artifical heart valves or other implanted medical devices like a pacemaker, defibrillator, or continuous glucose monitor? No   16. Do you have artificial joints? YES -    17. Are you allergic to latex? No       Health Care Directive  Patient does not have a Health Care Directive or Living Will: Discussed advance care planning with patient; however, patient declined at this time.    Preoperative Review of         Status of Chronic Conditions:  See problem list for active medical problems.  Problems all longstanding and stable, except as noted/documented.  See ROS for pertinent symptoms related to these conditions.    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  Josef Rod has no plan or code status on file. Discussed available resources and provided with information. Confirmed code status reflects current choices pending further ACP discussions.  Confirmed/documented legally designated decision makers.  Added by Reema Kumar            Gout 12/23/2014     Priority: Medium    Dyslipidemia 10/29/2013     Priority: Medium      Past Medical History:   Diagnosis Date    Dyslipidemia 2/6/2001    Hypertension     Obesity 8/21/2012     Past Surgical History:   Procedure Laterality Date    arthroscopy  2005    RT    ARTHROSCOPY KNEE  2005    LT    IR CONSULTATION FOR IR EXAM  6/23/2021    TONSILLECTOMY  19??     Current Outpatient Medications   Medication Sig Dispense Refill    amLODIPine (NORVASC) 10 MG tablet Take 1 tablet (10 mg) by mouth daily 90 tablet 3    aspirin 325 MG tablet Take 1 tablet by mouth daily      atorvastatin (LIPITOR) 20 MG tablet Take 1 tablet (20 mg) by mouth daily 90 tablet 3    Cholecalciferol (VITAMIN D) 2000 UNITS tablet Take 2,000 Units by mouth daily 100 tablet 3    EPINEPHrine (ANY BX GENERIC EQUIV) 0.3 MG/0.3ML injection 2-pack       levofloxacin (LEVAQUIN) 500 MG tablet Take 1 tablet (500 mg) by mouth daily 7 tablet 0    oxyBUTYnin (DITROPAN) 5 MG tablet Take 5 mg by mouth 2 times daily as needed      oxyCODONE-acetaminophen (PERCOCET) 5-325 MG tablet Take 1 tablet by mouth every 6 hours as needed      tamsulosin (FLOMAX) 0.4 MG capsule Take 0.4 mg by mouth daily         Allergies   Allergen Reactions    Penicillins         Social History     Tobacco Use    Smoking status: Never    Smokeless tobacco: Never    Tobacco  "comments:     no passive exposure   Substance Use Topics    Alcohol use: Yes     Comment: beer and liquor monthly     Family History   Problem Relation Age of Onset    Arthritis Mother     Heart Disease Father      History   Drug Use No         Review of Systems    Review of Systems  CONSTITUTIONAL: NEGATIVE for fever, chills, change in weight  INTEGUMENTARY/SKIN: NEGATIVE for worrisome rashes, moles or lesions  EYES: NEGATIVE for vision changes or irritation  ENT/MOUTH: NEGATIVE for ear, mouth and throat problems  RESP: NEGATIVE for significant cough or SOB  BREAST: NEGATIVE for masses, tenderness or discharge  CV: NEGATIVE for chest pain, palpitations or peripheral edema  GI: NEGATIVE for nausea, abdominal pain, heartburn, or change in bowel habits  : NEGATIVE for frequency, dysuria, or hematuria  MUSCULOSKELETAL: NEGATIVE for significant arthralgias or myalgia  NEURO: NEGATIVE for weakness, dizziness or paresthesias  ENDOCRINE: NEGATIVE for temperature intolerance, skin/hair changes  HEME: NEGATIVE for bleeding problems  PSYCHIATRIC: NEGATIVE for changes in mood or affect  Objective    /70   Pulse 74   Temp 97.5  F (36.4  C) (Tympanic)   Wt 109.3 kg (241 lb)   SpO2 96%   BMI 36.11 kg/m     Estimated body mass index is 36.11 kg/m  as calculated from the following:    Height as of 5/18/22: 1.74 m (5' 8.5\").    Weight as of this encounter: 109.3 kg (241 lb).  Physical Exam  GENERAL: alert and no distress  EYES: Eyes grossly normal to inspection, PERRL and conjunctivae and sclerae normal  HENT: ear canals and TM's normal, nose and mouth without ulcers or lesions  NECK: no adenopathy, no asymmetry, masses, or scars  RESP: lungs clear to auscultation - no rales, rhonchi or wheezes  CV: regular rate and rhythm, normal S1 S2, no S3 or S4, no murmur, click or rub, no peripheral edema  ABDOMEN: soft, nontender, no hepatosplenomegaly, no masses and bowel sounds normal  MS: no gross musculoskeletal defects " noted, no edema  SKIN: no suspicious lesions or rashes  NEURO: Normal strength and tone, mentation intact and speech normal  PSYCH: mentation appears normal, affect normal/bright    Recent Labs   Lab Test 01/30/24  1324 11/10/22  0821 05/18/22  0842   HGB 16.9  --   --      --   --    NA  --  137 136   POTASSIUM  --  4.3 4.1   CR  --  1.15 1.02        Diagnostics  Cbc stable.  UA shows hematuria no sign of infection culture pending but will not be done prior.     EKG: appears normal, NSR, normal axis, normal intervals, no acute ST/T changes c/w ischemia, no LVH by voltage criteria, unchanged from previous tracings    Revised Cardiac Risk Index (RCRI)  The patient has the following serious cardiovascular risks for perioperative complications:   - No serious cardiac risks = 0 points     RCRI Interpretation: 0 points: Class I (very low risk - 0.4% complication rate)         Signed Electronically by: Bj Estrada MD  Copy of this evaluation report is provided to requesting physician.

## 2024-02-13 ENCOUNTER — OFFICE VISIT (OUTPATIENT)
Dept: FAMILY MEDICINE | Facility: OTHER | Age: 64
End: 2024-02-13
Attending: FAMILY MEDICINE
Payer: COMMERCIAL

## 2024-02-13 VITALS
WEIGHT: 241 LBS | HEART RATE: 74 BPM | OXYGEN SATURATION: 96 % | DIASTOLIC BLOOD PRESSURE: 70 MMHG | BODY MASS INDEX: 36.11 KG/M2 | SYSTOLIC BLOOD PRESSURE: 126 MMHG | TEMPERATURE: 97.5 F

## 2024-02-13 DIAGNOSIS — N20.0 NEPHROLITHIASIS: ICD-10-CM

## 2024-02-13 DIAGNOSIS — Z01.818 PREOPERATIVE EXAMINATION: Primary | ICD-10-CM

## 2024-02-13 LAB
ALBUMIN UR-MCNC: 100 MG/DL
ANION GAP SERPL CALCULATED.3IONS-SCNC: 10 MMOL/L (ref 7–15)
APPEARANCE UR: ABNORMAL
ATRIAL RATE - MUSE: 65 BPM
BACTERIA #/AREA URNS HPF: ABNORMAL /HPF
BASOPHILS # BLD AUTO: 0 10E3/UL (ref 0–0.2)
BASOPHILS NFR BLD AUTO: 0 %
BILIRUB UR QL STRIP: ABNORMAL
BUN SERPL-MCNC: 15.7 MG/DL (ref 8–23)
CALCIUM SERPL-MCNC: 8.8 MG/DL (ref 8.8–10.2)
CHLORIDE SERPL-SCNC: 100 MMOL/L (ref 98–107)
COLOR UR AUTO: ABNORMAL
CREAT SERPL-MCNC: 0.95 MG/DL (ref 0.67–1.17)
DEPRECATED HCO3 PLAS-SCNC: 25 MMOL/L (ref 22–29)
DIASTOLIC BLOOD PRESSURE - MUSE: NORMAL MMHG
EGFRCR SERPLBLD CKD-EPI 2021: 90 ML/MIN/1.73M2
EOSINOPHIL # BLD AUTO: 0.2 10E3/UL (ref 0–0.7)
EOSINOPHIL NFR BLD AUTO: 4 %
ERYTHROCYTE [DISTWIDTH] IN BLOOD BY AUTOMATED COUNT: 13.5 % (ref 10–15)
GLUCOSE SERPL-MCNC: 86 MG/DL (ref 70–99)
GLUCOSE UR STRIP-MCNC: NEGATIVE MG/DL
HCT VFR BLD AUTO: 42.8 % (ref 40–53)
HGB BLD-MCNC: 14.5 G/DL (ref 13.3–17.7)
HGB UR QL STRIP: ABNORMAL
IMM GRANULOCYTES # BLD: 0 10E3/UL
IMM GRANULOCYTES NFR BLD: 1 %
INTERPRETATION ECG - MUSE: NORMAL
KETONES UR STRIP-MCNC: NEGATIVE MG/DL
LEUKOCYTE ESTERASE UR QL STRIP: ABNORMAL
LYMPHOCYTES # BLD AUTO: 1.8 10E3/UL (ref 0.8–5.3)
LYMPHOCYTES NFR BLD AUTO: 35 %
MCH RBC QN AUTO: 28.4 PG (ref 26.5–33)
MCHC RBC AUTO-ENTMCNC: 33.9 G/DL (ref 31.5–36.5)
MCV RBC AUTO: 84 FL (ref 78–100)
MONOCYTES # BLD AUTO: 0.7 10E3/UL (ref 0–1.3)
MONOCYTES NFR BLD AUTO: 14 %
NEUTROPHILS # BLD AUTO: 2.4 10E3/UL (ref 1.6–8.3)
NEUTROPHILS NFR BLD AUTO: 46 %
NITRATE UR QL: NEGATIVE
P AXIS - MUSE: 61 DEGREES
PH UR STRIP: 7 [PH] (ref 5–7)
PLATELET # BLD AUTO: 199 10E3/UL (ref 150–450)
POTASSIUM SERPL-SCNC: 3.6 MMOL/L (ref 3.4–5.3)
PR INTERVAL - MUSE: 158 MS
QRS DURATION - MUSE: 98 MS
QT - MUSE: 400 MS
QTC - MUSE: 416 MS
R AXIS - MUSE: 26 DEGREES
RBC # BLD AUTO: 5.1 10E6/UL (ref 4.4–5.9)
RBC #/AREA URNS AUTO: >100 /HPF
SODIUM SERPL-SCNC: 135 MMOL/L (ref 135–145)
SP GR UR STRIP: 1.02 (ref 1–1.03)
SQUAMOUS #/AREA URNS AUTO: ABNORMAL /LPF
SYSTOLIC BLOOD PRESSURE - MUSE: NORMAL MMHG
T AXIS - MUSE: 25 DEGREES
UROBILINOGEN UR STRIP-ACNC: 0.2 E.U./DL
VENTRICULAR RATE- MUSE: 65 BPM
WBC # BLD AUTO: 5.1 10E3/UL (ref 4–11)
WBC #/AREA URNS AUTO: ABNORMAL /HPF

## 2024-02-13 PROCEDURE — 81001 URINALYSIS AUTO W/SCOPE: CPT | Performed by: FAMILY MEDICINE

## 2024-02-13 PROCEDURE — 93000 ELECTROCARDIOGRAM COMPLETE: CPT | Mod: 77 | Performed by: INTERNAL MEDICINE

## 2024-02-13 PROCEDURE — 87086 URINE CULTURE/COLONY COUNT: CPT | Performed by: FAMILY MEDICINE

## 2024-02-13 PROCEDURE — 85025 COMPLETE CBC W/AUTO DIFF WBC: CPT | Performed by: FAMILY MEDICINE

## 2024-02-13 PROCEDURE — 99213 OFFICE O/P EST LOW 20 MIN: CPT | Performed by: FAMILY MEDICINE

## 2024-02-13 PROCEDURE — 80048 BASIC METABOLIC PNL TOTAL CA: CPT | Performed by: FAMILY MEDICINE

## 2024-02-13 PROCEDURE — 36415 COLL VENOUS BLD VENIPUNCTURE: CPT | Performed by: FAMILY MEDICINE

## 2024-02-13 RX ORDER — TAMSULOSIN HYDROCHLORIDE 0.4 MG/1
0.4 CAPSULE ORAL DAILY
COMMUNITY
Start: 2024-02-02

## 2024-02-13 RX ORDER — OXYCODONE AND ACETAMINOPHEN 5; 325 MG/1; MG/1
1 TABLET ORAL EVERY 6 HOURS PRN
COMMUNITY
Start: 2024-02-02

## 2024-02-13 RX ORDER — OXYBUTYNIN CHLORIDE 5 MG/1
5 TABLET ORAL 2 TIMES DAILY PRN
COMMUNITY
Start: 2024-02-02

## 2024-02-13 ASSESSMENT — PAIN SCALES - GENERAL: PAINLEVEL: NO PAIN (0)

## 2024-02-14 ENCOUNTER — TELEPHONE (OUTPATIENT)
Dept: FAMILY MEDICINE | Facility: OTHER | Age: 64
End: 2024-02-14

## 2024-02-15 LAB — BACTERIA UR CULT: NORMAL

## 2024-03-04 NOTE — NURSING NOTE
"Chief Complaint   Patient presents with     Pre-Op Exam       Initial Blood Pressure 132/74 (BP Location: Left arm, Patient Position: Sitting, Cuff Size: Adult Regular)   Pulse 66   Temperature 97.6  F (36.4  C) (Tympanic)   Height 1.74 m (5' 8.5\")   Weight 113.4 kg (250 lb)   Oxygen Saturation 96%   Body Mass Index 37.46 kg/m   Estimated body mass index is 37.46 kg/m  as calculated from the following:    Height as of this encounter: 1.74 m (5' 8.5\").    Weight as of this encounter: 113.4 kg (250 lb).  Medication Reconciliation: complete  Yvette Hamlin LPN  " Detail Level: Detailed Depth Of Biopsy: dermis Was A Bandage Applied: Yes Size Of Lesion In Cm: 0.7 X Size Of Lesion In Cm: 0.6 Biopsy Type: H and E Biopsy Method: Dermablade Anesthesia Type: 1% lidocaine with epinephrine 1:100,000 buffered with 8.4% sodium bicarbonate (1:9 ratio) Anesthesia Volume In Cc: 0 Hemostasis: Drysol Wound Care: Petrolatum Dressing: bandage Destruction After The Procedure: No Type Of Destruction Used: Curettage Curettage Text: The wound bed was treated with curettage after the biopsy was performed. Cryotherapy Text: The wound bed was treated with cryotherapy after the biopsy was performed. Electrodesiccation Text: The wound bed was treated with electrodesiccation after the biopsy was performed. Electrodesiccation And Curettage Text: The wound bed was treated with electrodesiccation and curettage after the biopsy was performed. Silver Nitrate Text: The wound bed was treated with silver nitrate after the biopsy was performed. Lab: [id number] Lab Facility: [id number] Consent: Verbal consent was obtained and risks were reviewed including but not limited to scarring, infection, bleeding, scabbing, incomplete removal, nerve damage and allergy to anesthesia. Post-Care Instructions: I reviewed with the patient in detail post-care instructions. Patient is to keep the biopsy site dry overnight, and then apply vaseline twice daily until healed. Notification Instructions: Patient will be notified of biopsy results. However, patient instructed to call the office if not contacted within 2 weeks. Billing Type: Third-Party Bill Information: Selecting Yes will display possible errors in your note based on the variables you have selected. This validation is only offered as a suggestion for you. PLEASE NOTE THAT THE VALIDATION TEXT WILL BE REMOVED WHEN YOU FINALIZE YOUR NOTE. IF YOU WANT TO FAX A PRELIMINARY NOTE YOU WILL NEED TO TOGGLE THIS TO 'NO' IF YOU DO NOT WANT IT IN YOUR FAXED NOTE. Size Of Lesion In Cm: 0.9

## 2024-09-15 ENCOUNTER — HEALTH MAINTENANCE LETTER (OUTPATIENT)
Age: 64
End: 2024-09-15

## 2025-03-13 ENCOUNTER — MYC MEDICAL ADVICE (OUTPATIENT)
Dept: PULMONOLOGY | Facility: OTHER | Age: 65
End: 2025-03-13

## 2025-03-13 DIAGNOSIS — R06.83 SNORING: Primary | ICD-10-CM

## 2025-03-13 ASSESSMENT — SLEEP AND FATIGUE QUESTIONNAIRES
HOW LIKELY ARE YOU TO NOD OFF OR FALL ASLEEP IN A CAR, WHILE STOPPED FOR A FEW MINUTES IN TRAFFIC: WOULD NEVER DOZE
HOW LIKELY ARE YOU TO NOD OFF OR FALL ASLEEP WHILE WATCHING TV: HIGH CHANCE OF DOZING
HOW LIKELY ARE YOU TO NOD OFF OR FALL ASLEEP WHILE SITTING QUIETLY AFTER LUNCH WITHOUT ALCOHOL: WOULD NEVER DOZE
HOW LIKELY ARE YOU TO NOD OFF OR FALL ASLEEP WHILE SITTING INACTIVE IN A PUBLIC PLACE: WOULD NEVER DOZE
HOW LIKELY ARE YOU TO NOD OFF OR FALL ASLEEP WHILE SITTING AND READING: MODERATE CHANCE OF DOZING
HOW LIKELY ARE YOU TO NOD OFF OR FALL ASLEEP WHILE SITTING AND TALKING TO SOMEONE: WOULD NEVER DOZE
HOW LIKELY ARE YOU TO NOD OFF OR FALL ASLEEP WHILE LYING DOWN TO REST IN THE AFTERNOON WHEN CIRCUMSTANCES PERMIT: HIGH CHANCE OF DOZING
HOW LIKELY ARE YOU TO NOD OFF OR FALL ASLEEP WHEN YOU ARE A PASSENGER IN A CAR FOR AN HOUR WITHOUT A BREAK: MODERATE CHANCE OF DOZING

## 2025-03-13 NOTE — PROGRESS NOTES
03/13/25 1224   Reason For Your Visit   Please briefly describe the main reason(s) for your sleep visit (Patient-Rptd)  Find out if I have Sleep Apnea, to see if thst could be causing higher blood pressure.   Approximately when did this problem start (Patient-Rptd)  For years. 10?   What are your goals for this visit (Patient-Rptd)  See above.   Time in Bed - Work Or School Days   Do you work or go to school (Patient-Rptd)  No   What time do you usually get into bed (Patient-Rptd)  11:00 pm   About how long does it take you to fall asleep (Patient-Rptd)  Usually a few minutes.   How often do you have trouble falling asleep (Patient-Rptd)  Maybe 1.   How often do you wake up during the night (Patient-Rptd)  2-3 times.   What wakes you up at night (Patient-Rptd)  Use the bathroom;Anxiety   How often do you have trouble falling back to sleep (Patient-Rptd)  3   About how long does it take to fall back to sleep (Patient-Rptd)  10-40 minutes   What do you usually do if you have trouble getting back to sleep (Patient-Rptd)  Lay there, watch TV, play [employer] on phone.   What time do you usually get out of bed to start your day (Patient-Rptd)  7:00 am   Do you use an alarm (Patient-Rptd)  No   Time in Bed - Weekends/Non-work Days/All Other Days   What time do you usually get into bed (Patient-Rptd)  11:00 pm   About how long does it take you to fall asleep (Patient-Rptd)  5-10 minutes   What time do you usually get out of bed to start your day (Patient-Rptd)  7:00-8:00   Do you use an alarm (Patient-Rptd)  No   Sleep Need   On average, about how much sleep do you think you get (Patient-Rptd)  4-5 hours   About how much sleep do you think you need (Patient-Rptd)  5 hours   Sleep Position   Which sleep positions do you prefer (Patient-Rptd)  Back;Side;Recliner   Do you do any of the following activities in bed (Patient-Rptd)  Watch TV;Use phone, computer, or tablet   How often do you take a nap on purpose (Patient-Rptd)   5 days a werk   About how long are your naps (Patient-Rptd)  15-20 minutes   Do you feel better after naps (Patient-Rptd)  Yes   How often do you doze off unintentionally (Patient-Rptd)  5 days a week, watching tv or relaxing   Have you ever had a driving accident or near-miss due to sleepiness/drowsiness (Patient-Rptd)  No   Sleep Disruptions - Breathing/Snoring   Do you snore (Patient-Rptd)  Yes   Do other people complain about your snoring (Patient-Rptd)  Yes   Have you been told you stop breathing in your sleep (Patient-Rptd)  No   Do you have issues with any of the following (Patient-Rptd)  Getting up to urinate more than once   Sleep Disruptions - Movement   Do you get pain, discomfort, with an urge to move (Patient-Rptd)  Yes   Does it happen when you are resting (Patient-Rptd)  No   Does it get better if you move around (Patient-Rptd)  Yes   Does it happen more at night (Patient-Rptd)  Yes   Have you been told you kick your legs at night (Patient-Rptd)  No   Sleep Disruptions - Behaviours in Sleep   Do you ever experience sudden muscle weakness during the day (Patient-Rptd)  No   4) Is there anything else you would like your sleep provider to know (Patient-Rptd)  No   Caffeine, Alcohol and Other Substances   How many caffeinated beverages (coffee, tea, soda, energy drinks) per day (Patient-Rptd)  Zero   What time of day is your last caffeine use (Patient-Rptd)  None   List any prescribed or over the counter stimulants that you take (Patient-Rptd)  None   List any prescribed or over the counter sleep medication you take (Patient-Rptd)  Melatonin, 1 Aleve pm   Do you drink alcohol to help you sleep (Patient-Rptd)  No   Do you drink alcohol near bedtime (Patient-Rptd)  No   Family History   Has any family member been diagnosed with a sleep disorder (Patient-Rptd)  No   In the last TWO WEEKS have you experienced any of the following symptoms?   Weight Gain (Patient-Rptd)  Yes   Pain at Night (Patient-Rptd)  Yes    Sore Throat in Morning (Patient-Rptd)  No   Dry Mouth in the Morning (Patient-Rptd)  No   Shortness of Breath Lying Flat (Patient-Rptd)  No   Shortness of Breath With Activity (Patient-Rptd)  Yes   Awakening with Shortness of Breath (Patient-Rptd)  No   Increased Cough (Patient-Rptd)  No   Heart Racing at Night (Patient-Rptd)  No   Swelling in Feet or Legs (Patient-Rptd)  No   Diarrhea at Night (Patient-Rptd)  No   Heartburn at Night (Patient-Rptd)  No   Urinating More than Once at Night (Patient-Rptd)  Yes   Losing Control of Urine at Night (Patient-Rptd)  No   Joint Pains at Night (Patient-Rptd)  Yes   Headaches in Morning (Patient-Rptd)  No   Weakness in Arms or Legs (Patient-Rptd)  No   Depressed Mood (Patient-Rptd)  No   Anxiety (Patient-Rptd)  No         3/13/2025    12:30 PM    [place] Sleepiness Scale ( MARGARITO Savage  [id number]<br>ESS - [place]/English - Final version - 21 Nov 07 - [facility] [place].)   Sitting and reading Moderate chance of dozing   Watching TV High chance of dozing   Sitting, inactive in a public place (e.g. a theatre or a meeting) Would never doze   As a passenger in a car for an hour without a break Moderate chance of dozing   Lying down to rest in the afternoon when circumstances permit High chance of dozing   Sitting and talking to someone Would never doze   Sitting quietly after a lunch without alcohol Would never doze   In a car, while stopped for a few minutes in traffic Would never doze   [place] Score (MC) 10   [place] Score (Sleep) 10        Patient-reported         3/13/2025    12:26 PM   Insomnia Severity Index (GERARDO)   Difficulty falling asleep 1   Difficulty staying asleep 4   Problems waking up too early 4   How SATISFIED/DISSATISFIED are you with your CURRENT sleep pattern? 4   How NOTICEABLE to others do you think your sleep problem is in terms of impairing the quality of your life? 3   How WORRIED/DISTRESSED are you about your current sleep problem? 2   To what extent do  you consider your sleep problem to INTERFERE with your daily functioning (e.g. daytime fatigue, mood, ability to function at work/daily chores, concentration, memory, mood, etc.) CURRENTLY? 2   GERARDO Total Score 20        Patient-reported         3/13/2025    12:28 PM   STOP BANG Questionnaire (  2008, the [employer]   1. Snoring - Do you snore loudly (louder than talking or loud enough to be heard through closed doors)? Yes   2. Tired - Do you often feel tired, fatigued, or sleepy during daytime? Yes   3. Observed - Has anyone observed you stop breathing during your sleep? No   4. Blood pressure - Do you have or are you being treated for high blood pressure? Yes   5. BMI - BMI more than 35 kg/m2? Yes   6. Age - Age over 50 yr old? Yes   7. Neck circumference - Neck circumference greater than 40 cm? No   8. Gender - Gender male? Yes   STOP BANG Score (MC): 6 (High risk of BASIA)